# Patient Record
Sex: FEMALE | Race: OTHER | NOT HISPANIC OR LATINO
[De-identification: names, ages, dates, MRNs, and addresses within clinical notes are randomized per-mention and may not be internally consistent; named-entity substitution may affect disease eponyms.]

---

## 2017-12-12 ENCOUNTER — APPOINTMENT (OUTPATIENT)
Dept: FAMILY MEDICINE | Facility: CLINIC | Age: 57
End: 2017-12-12
Payer: COMMERCIAL

## 2017-12-12 ENCOUNTER — NON-APPOINTMENT (OUTPATIENT)
Age: 57
End: 2017-12-12

## 2017-12-12 VITALS
OXYGEN SATURATION: 98 % | BODY MASS INDEX: 34.24 KG/M2 | DIASTOLIC BLOOD PRESSURE: 76 MMHG | SYSTOLIC BLOOD PRESSURE: 112 MMHG | WEIGHT: 208 LBS | HEIGHT: 65.5 IN | HEART RATE: 70 BPM | TEMPERATURE: 98.8 F

## 2017-12-12 PROCEDURE — 99396 PREV VISIT EST AGE 40-64: CPT | Mod: 25

## 2017-12-12 PROCEDURE — 93000 ELECTROCARDIOGRAM COMPLETE: CPT

## 2018-07-24 ENCOUNTER — APPOINTMENT (OUTPATIENT)
Dept: FAMILY MEDICINE | Facility: CLINIC | Age: 58
End: 2018-07-24

## 2018-07-26 ENCOUNTER — APPOINTMENT (OUTPATIENT)
Dept: ORTHOPEDIC SURGERY | Facility: CLINIC | Age: 58
End: 2018-07-26
Payer: COMMERCIAL

## 2018-07-26 VITALS
RESPIRATION RATE: 14 BRPM | HEART RATE: 62 BPM | HEIGHT: 65.5 IN | SYSTOLIC BLOOD PRESSURE: 131 MMHG | BODY MASS INDEX: 31.27 KG/M2 | WEIGHT: 190 LBS | DIASTOLIC BLOOD PRESSURE: 88 MMHG

## 2018-07-26 PROCEDURE — 99203 OFFICE O/P NEW LOW 30 MIN: CPT

## 2018-07-26 PROCEDURE — 73030 X-RAY EXAM OF SHOULDER: CPT | Mod: RT

## 2018-12-10 ENCOUNTER — OTHER (OUTPATIENT)
Age: 58
End: 2018-12-10

## 2019-01-23 ENCOUNTER — OUTPATIENT (OUTPATIENT)
Dept: OUTPATIENT SERVICES | Facility: HOSPITAL | Age: 59
LOS: 1 days | Discharge: HOME | End: 2019-01-23

## 2019-01-23 DIAGNOSIS — C50.919 MALIGNANT NEOPLASM OF UNSPECIFIED SITE OF UNSPECIFIED FEMALE BREAST: ICD-10-CM

## 2019-02-07 ENCOUNTER — APPOINTMENT (OUTPATIENT)
Dept: HEMATOLOGY ONCOLOGY | Facility: CLINIC | Age: 59
End: 2019-02-07
Payer: COMMERCIAL

## 2019-02-07 VITALS
OXYGEN SATURATION: 98 % | TEMPERATURE: 98 F | BODY MASS INDEX: 33.71 KG/M2 | SYSTOLIC BLOOD PRESSURE: 114 MMHG | DIASTOLIC BLOOD PRESSURE: 74 MMHG | HEART RATE: 71 BPM | RESPIRATION RATE: 16 BRPM | HEIGHT: 65.51 IN | WEIGHT: 204.81 LBS

## 2019-02-07 DIAGNOSIS — Z86.39 PERSONAL HISTORY OF OTHER ENDOCRINE, NUTRITIONAL AND METABOLIC DISEASE: ICD-10-CM

## 2019-02-07 DIAGNOSIS — Z87.891 PERSONAL HISTORY OF NICOTINE DEPENDENCE: ICD-10-CM

## 2019-02-07 PROCEDURE — 99245 OFF/OP CONSLTJ NEW/EST HI 55: CPT

## 2019-02-07 NOTE — HISTORY OF PRESENT ILLNESS
[Disease: _____________________] : Disease: [unfilled] [T: ___] : T[unfilled] [N: ___] : N[unfilled] [M: ___] : M[unfilled] [de-identified] : Left breast cancer diagnosed at the age of 58\par 12/02/17 Screening mammography negative\par 12/14/18 Screening mammography showed a possible focal asymmetry in the left UOQ\par 01/03/19 Left breast diagnostic ultrasound showed an irregular hypoechoic mass at 1 o'clock axis measuring 0.6 x 1.5 x 0.8 cm, likely corresponding to a mammographic architectural distortion. Sonoguided core biopsy recommended.\par 01/04/19 Left breast 1 o'clock axis 4 cm FN sonoguided core biopsy showed infiltrating lobular carcinoma, SBR 5/9, ER/TN  %, Ki-67 10% and HER-2 negative.\par 01/14/19 MRI of the breast showed a 1.9 x 1.4 x 0.7 cm irregular mass with an irregular margin in the left breast at 2 o'clock axis, consistent with known carcinoma\par 01/24/19 Left breast 1 o'clock axis UOQ lumpectomy showed a 2 cm invasive carcinoma with ductal and lobular features, SBR 6/9 with 0/2 LN involved, ER 70%, [place] very focal weak positivity, KI-67 14% and HER-2 negative. Left inferior margin revealed multiple foci of invasive carcinoma with lobular features, largest one measuring 4 mm, located at 1.5 mm from the final inferior margin, multiple foci of LCIS as well. Left anterior medial margin showed a 1.2 mm focus of invasive carcinoma with lobular features located at 3 mm from the margin and a 1.5 mm focus about 0.5 mm from the anterior medial margin\par Oncotype ordered and is pending [de-identified] :  2 cm Invasive carcinoma with ductal and lobular features, SBR 6/9, 0/2 SLN, ER 70%, NE weakly +,  HER-2 negative, KI-67 14%. Multifocal given the presence of foci of invasive carcinoma in additional margins. [de-identified] : Courtney comes to discuss the medical management of her breast cancer. She is healing well from her surgery but is still feeling a little overwhelmed by the whole process of dealing with the cancer.  She is generally in very good health and is active. Prior to her breast cancer diagnosis she had been on hormone replacement therapy for 5-6 years for menopausal symptoms. 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 Her oldest son went to [place] and is now working in the city for a [employer] agency. Her daughter lives at home and is in school studying communications. Her youngest son is in school at [place]. \par \par HEALTH MAINTENANCE:\par Mammogram: 12/14/18 with abnormal finding consistent with invasive lobular carcinoma\par Pap smear: 11/18\par Bone density DEXA scan: none to date\par Colonoscopy: 6/12/18 negative\par Genetic testing: none to date although her sister had breast cancer at age 26; she does not know if she had genetic testing\par

## 2019-02-07 NOTE — CONSULT LETTER
[Dear  ___] : Dear  [unfilled], [Consult Letter:] : I had the pleasure of evaluating your patient, [unfilled]. [Please see my note below.] : Please see my note below. [Consult Closing:] : Thank you very much for allowing me to participate in the care of this patient.  If you have any questions, please do not hesitate to contact me. [Sincerely,] : Sincerely, [DrDayne  ___] : Dr. BOO [DrDayne ___] : Dr. BOO [FreeTextEntry2] : Hoang Rudolph MD\par [address] [place] [address]\[place] [id number] [FreeTextEntry3] : Maame Nieves MD\par Associate Chief of Clinical Affairs\par [facility]\par [facility]\par  of Medicine\par [facility]\par \par

## 2019-02-07 NOTE — PHYSICAL EXAM
[Fully active, able to carry on all pre-disease performance without restriction] : Status 0 - Fully active, able to carry on all pre-disease performance without restriction [Normal] : affect appropriate [de-identified] : Well healed incision left lateral breast

## 2019-02-11 ENCOUNTER — RESULT REVIEW (OUTPATIENT)
Age: 59
End: 2019-02-11

## 2019-02-12 ENCOUNTER — RESULT REVIEW (OUTPATIENT)
Age: 59
End: 2019-02-12

## 2019-03-07 ENCOUNTER — OUTPATIENT (OUTPATIENT)
Dept: OUTPATIENT SERVICES | Facility: HOSPITAL | Age: 59
LOS: 1 days | Discharge: HOME | End: 2019-03-07

## 2019-03-07 DIAGNOSIS — C50.919 MALIGNANT NEOPLASM OF UNSPECIFIED SITE OF UNSPECIFIED FEMALE BREAST: ICD-10-CM

## 2019-03-14 ENCOUNTER — APPOINTMENT (OUTPATIENT)
Dept: HEMATOLOGY ONCOLOGY | Facility: CLINIC | Age: 59
End: 2019-03-14
Payer: COMMERCIAL

## 2019-03-14 ENCOUNTER — RESULT REVIEW (OUTPATIENT)
Age: 59
End: 2019-03-14

## 2019-03-14 VITALS
WEIGHT: 205.03 LBS | BODY MASS INDEX: 33.59 KG/M2 | OXYGEN SATURATION: 98 % | TEMPERATURE: 98 F | DIASTOLIC BLOOD PRESSURE: 86 MMHG | SYSTOLIC BLOOD PRESSURE: 137 MMHG | RESPIRATION RATE: 16 BRPM | HEART RATE: 63 BPM

## 2019-03-14 LAB
BASOPHILS # BLD AUTO: 0 K/UL — SIGNIFICANT CHANGE UP (ref 0–0.2)
BASOPHILS NFR BLD AUTO: 1 % — SIGNIFICANT CHANGE UP (ref 0–2)
EOSINOPHIL # BLD AUTO: 0.1 K/UL — SIGNIFICANT CHANGE UP (ref 0–0.5)
EOSINOPHIL NFR BLD AUTO: 2 % — SIGNIFICANT CHANGE UP (ref 0–6)
HCT VFR BLD CALC: 41.5 % — SIGNIFICANT CHANGE UP (ref 34.5–45)
HGB BLD-MCNC: 13.8 G/DL — SIGNIFICANT CHANGE UP (ref 11.5–15.5)
LYMPHOCYTES # BLD AUTO: 1.5 K/UL — SIGNIFICANT CHANGE UP (ref 1–3.3)
LYMPHOCYTES # BLD AUTO: 42 % — SIGNIFICANT CHANGE UP (ref 13–44)
MCHC RBC-ENTMCNC: 29.3 PG — SIGNIFICANT CHANGE UP (ref 27–34)
MCHC RBC-ENTMCNC: 33.1 G/DL — SIGNIFICANT CHANGE UP (ref 32–36)
MCV RBC AUTO: 88.5 FL — SIGNIFICANT CHANGE UP (ref 80–100)
MONOCYTES # BLD AUTO: 0.2 K/UL — SIGNIFICANT CHANGE UP (ref 0–0.9)
MONOCYTES NFR BLD AUTO: 8 % — SIGNIFICANT CHANGE UP (ref 2–14)
NEUTROPHILS # BLD AUTO: 1.3 K/UL — LOW (ref 1.8–7.4)
NEUTROPHILS NFR BLD AUTO: 47 % — SIGNIFICANT CHANGE UP (ref 43–77)
PLAT MORPH BLD: NORMAL — SIGNIFICANT CHANGE UP
PLATELET # BLD AUTO: 264 K/UL — SIGNIFICANT CHANGE UP (ref 150–400)
RBC # BLD: 4.69 M/UL — SIGNIFICANT CHANGE UP (ref 3.8–5.2)
RBC # FLD: 11.8 % — SIGNIFICANT CHANGE UP (ref 10.3–14.5)
RBC BLD AUTO: NORMAL — SIGNIFICANT CHANGE UP
WBC # BLD: 3.1 K/UL — LOW (ref 3.8–10.5)
WBC # FLD AUTO: 3.1 K/UL — LOW (ref 3.8–10.5)

## 2019-03-14 PROCEDURE — 99215 OFFICE O/P EST HI 40 MIN: CPT

## 2019-03-18 RX ORDER — DEXAMETHASONE 4 MG/1
4 TABLET ORAL
Qty: 24 | Refills: 0 | Status: DISCONTINUED | COMMUNITY
Start: 2019-03-15 | End: 2019-03-18

## 2019-03-20 RX ORDER — APREPITANT 80 MG/1
80 CAPSULE ORAL
Qty: 2 | Refills: 1 | Status: DISCONTINUED | COMMUNITY
Start: 2019-03-15 | End: 2019-03-20

## 2019-03-21 ENCOUNTER — APPOINTMENT (OUTPATIENT)
Dept: INFUSION THERAPY | Facility: HOSPITAL | Age: 59
End: 2019-03-21

## 2019-03-21 ENCOUNTER — RESULT REVIEW (OUTPATIENT)
Age: 59
End: 2019-03-21

## 2019-03-21 LAB
HCT VFR BLD CALC: 39 % — SIGNIFICANT CHANGE UP (ref 34.5–45)
HGB BLD-MCNC: 13.5 G/DL — SIGNIFICANT CHANGE UP (ref 11.5–15.5)
MCHC RBC-ENTMCNC: 30.3 PG — SIGNIFICANT CHANGE UP (ref 27–34)
MCHC RBC-ENTMCNC: 34.6 G/DL — SIGNIFICANT CHANGE UP (ref 32–36)
MCV RBC AUTO: 87.4 FL — SIGNIFICANT CHANGE UP (ref 80–100)
PLATELET # BLD AUTO: 287 K/UL — SIGNIFICANT CHANGE UP (ref 150–400)
RBC # BLD: 4.46 M/UL — SIGNIFICANT CHANGE UP (ref 3.8–5.2)
RBC # FLD: 11.5 % — SIGNIFICANT CHANGE UP (ref 10.3–14.5)
WBC # BLD: 8 K/UL — SIGNIFICANT CHANGE UP (ref 3.8–10.5)
WBC # FLD AUTO: 8 K/UL — SIGNIFICANT CHANGE UP (ref 3.8–10.5)

## 2019-03-22 DIAGNOSIS — Z51.11 ENCOUNTER FOR ANTINEOPLASTIC CHEMOTHERAPY: ICD-10-CM

## 2019-03-22 DIAGNOSIS — R11.2 NAUSEA WITH VOMITING, UNSPECIFIED: ICD-10-CM

## 2019-03-22 DIAGNOSIS — Z51.89 ENCOUNTER FOR OTHER SPECIFIED AFTERCARE: ICD-10-CM

## 2019-03-27 ENCOUNTER — OTHER (OUTPATIENT)
Age: 59
End: 2019-03-27

## 2019-03-29 ENCOUNTER — OUTPATIENT (OUTPATIENT)
Dept: OUTPATIENT SERVICES | Facility: HOSPITAL | Age: 59
LOS: 1 days | Discharge: HOME | End: 2019-03-29

## 2019-03-29 DIAGNOSIS — C50.919 MALIGNANT NEOPLASM OF UNSPECIFIED SITE OF UNSPECIFIED FEMALE BREAST: ICD-10-CM

## 2019-03-29 DIAGNOSIS — Z98.890 OTHER SPECIFIED POSTPROCEDURAL STATES: Chronic | ICD-10-CM

## 2019-03-29 PROBLEM — C50.912 MALIGNANT NEOPLASM OF UNSPECIFIED SITE OF LEFT FEMALE BREAST: Chronic | Status: ACTIVE | Noted: 2019-03-20

## 2019-03-29 NOTE — HISTORY OF PRESENT ILLNESS
[Disease: _____________________] : Disease: [unfilled] [T: ___] : T[unfilled] [N: ___] : N[unfilled] [M: ___] : M[unfilled] [AJCC Stage: ____] : AJCC Stage: [unfilled] [Date: ____________] : Patient's last distress assessment performed on [unfilled]. [1 - Distress Level] : Distress Level: 1 [Patient given social work contact information and resource sheet] : Patient was given social work contact information and resource sheet [de-identified] : Left breast cancer diagnosed at the age of 58\par 12/02/17 Screening mammography negative\par 12/14/18 Screening mammography showed a possible focal asymmetry in the left UOQ\par 01/03/19 Left breast diagnostic ultrasound showed an irregular hypoechoic mass at 1 o'clock axis measuring 0.6 x 1.5 x 0.8 cm, likely corresponding to a mammographic architectural distortion. Sonoguided core biopsy recommended.\par 01/04/19 Left breast 1 o'clock axis 4 cm FN sonoguided core biopsy showed infiltrating lobular carcinoma, SBR 5/9, ER/[place]  %, Ki-67 10% and HER-2 negative.\par 01/14/19 MRI of the breast showed a 1.9 x 1.4 x 0.7 cm irregular mass with an irregular margin in the left breast at 2 o'clock axis, consistent with known carcinoma\par 01/24/19 Left breast 1 o'clock axis UOQ lumpectomy showed a 2 cm invasive carcinoma with ductal and lobular features, SBR 6/9 with 0/2 LN involved, ER 70%, [place] very focal weak positivity, KI-67 14% and HER-2 negative. Left inferior margin revealed multiple foci of invasive carcinoma with lobular features, largest one measuring 4 mm, located at 1.5 mm from the final inferior margin, multiple foci of LCIS as well. Left anterior medial margin showed a 1.2 mm focus of invasive carcinoma with lobular features located at 3 mm from the margin and a 1.5 mm focus about 0.5 mm from the anterior medial margin\par 3/1/19 Oncotype showed a recurrence score of 31, which is correlated with a 19% risk of distant recurrence at 9 years and an absolute chemotherapy benefit of >15%\par 3/21/19 Taxotere and Cytoxan with Onpro every 3 weeks x 4 [de-identified] :  2 cm Invasive carcinoma with ductal and lobular features, SBR 6/9, 0/2 SLN, ER 70%, NE weakly +,  HER-2 negative, KI-67 14%. Multifocal given the presence of foci of invasive carcinoma in additional margins, Oncotype = 31 (RR 19%) [de-identified] : Courtney presents to the office for an evaluation and review plan of care after her Oncotype DX assay revealed a high risk score of 31.\par She is here with her sister to discuss chemotherapy and the potential risks and side effects. 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 Her oldest son went to [place] and is now working in the city for a [employer] agency. Her daughter lives at home and is in school studying communications. Her youngest son is in school at [place]. \par \par HEALTH MAINTENANCE:\par Mammogram: 12/14/18 with abnormal finding consistent with invasive lobular carcinoma\par Pap smear: 11/18\par Bone density DEXA scan: none to date\par Colonoscopy: 6/12/18 negative\par Genetic testing: none to date although her sister had breast cancer at age 26; she does not know if she had genetic testing\par

## 2019-03-29 NOTE — PHYSICAL EXAM
[Fully active, able to carry on all pre-disease performance without restriction] : Status 0 - Fully active, able to carry on all pre-disease performance without restriction [Normal] : affect appropriate [de-identified] : Well healed incision left lateral breast

## 2019-03-30 LAB
25(OH)D3 SERPL-MCNC: 43.5 NG/ML
ALBUMIN SERPL ELPH-MCNC: 4.7 G/DL
ALP BLD-CCNC: 92 U/L
ALT SERPL-CCNC: 18 U/L
ANION GAP SERPL CALC-SCNC: 14 MMOL/L
APTT BLD: 33.8 SEC
AST SERPL-CCNC: 23 U/L
BILIRUB SERPL-MCNC: 0.4 MG/DL
BUN SERPL-MCNC: 12 MG/DL
CALCIUM SERPL-MCNC: 10 MG/DL
CHLORIDE SERPL-SCNC: 105 MMOL/L
CO2 SERPL-SCNC: 24 MMOL/L
CREAT SERPL-MCNC: 0.78 MG/DL
GLUCOSE SERPL-MCNC: 109 MG/DL
HBV CORE IGG+IGM SER QL: NONREACTIVE
HBV SURFACE AB SER QL: NONREACTIVE
HBV SURFACE AG SER QL: NONREACTIVE
HCV AB SER QL: NONREACTIVE
HCV S/CO RATIO: 0.18 S/CO
INR PPP: 0.97 RATIO
POTASSIUM SERPL-SCNC: 4.2 MMOL/L
PROT SERPL-MCNC: 7.4 G/DL
PT BLD: 11 SEC
SODIUM SERPL-SCNC: 143 MMOL/L

## 2019-04-02 ENCOUNTER — RX RENEWAL (OUTPATIENT)
Age: 59
End: 2019-04-02

## 2019-04-09 ENCOUNTER — RESULT REVIEW (OUTPATIENT)
Age: 59
End: 2019-04-09

## 2019-04-09 ENCOUNTER — APPOINTMENT (OUTPATIENT)
Dept: HEMATOLOGY ONCOLOGY | Facility: CLINIC | Age: 59
End: 2019-04-09
Payer: COMMERCIAL

## 2019-04-09 VITALS
TEMPERATURE: 97.7 F | OXYGEN SATURATION: 99 % | SYSTOLIC BLOOD PRESSURE: 118 MMHG | RESPIRATION RATE: 16 BRPM | BODY MASS INDEX: 34.56 KG/M2 | DIASTOLIC BLOOD PRESSURE: 80 MMHG | HEART RATE: 64 BPM | WEIGHT: 210.98 LBS

## 2019-04-09 LAB
BASOPHILS # BLD AUTO: 0 K/UL — SIGNIFICANT CHANGE UP (ref 0–0.2)
BASOPHILS NFR BLD AUTO: 1 % — SIGNIFICANT CHANGE UP (ref 0–2)
EOSINOPHIL # BLD AUTO: 0 K/UL — SIGNIFICANT CHANGE UP (ref 0–0.5)
HCT VFR BLD CALC: 35.3 % — SIGNIFICANT CHANGE UP (ref 34.5–45)
HGB BLD-MCNC: 12 G/DL — SIGNIFICANT CHANGE UP (ref 11.5–15.5)
LYMPHOCYTES # BLD AUTO: 1.3 K/UL — SIGNIFICANT CHANGE UP (ref 1–3.3)
LYMPHOCYTES # BLD AUTO: 45 % — HIGH (ref 13–44)
MCHC RBC-ENTMCNC: 29.9 PG — SIGNIFICANT CHANGE UP (ref 27–34)
MCHC RBC-ENTMCNC: 33.9 G/DL — SIGNIFICANT CHANGE UP (ref 32–36)
MCV RBC AUTO: 88.1 FL — SIGNIFICANT CHANGE UP (ref 80–100)
MONOCYTES # BLD AUTO: 0.6 K/UL — SIGNIFICANT CHANGE UP (ref 0–0.9)
MONOCYTES NFR BLD AUTO: 15 % — HIGH (ref 2–14)
NEUTROPHILS # BLD AUTO: 1.3 K/UL — LOW (ref 1.8–7.4)
NEUTROPHILS NFR BLD AUTO: 39 % — LOW (ref 43–77)
PLAT MORPH BLD: NORMAL — SIGNIFICANT CHANGE UP
PLATELET # BLD AUTO: 251 K/UL — SIGNIFICANT CHANGE UP (ref 150–400)
RBC # BLD: 4.01 M/UL — SIGNIFICANT CHANGE UP (ref 3.8–5.2)
RBC # FLD: 12.6 % — SIGNIFICANT CHANGE UP (ref 10.3–14.5)
RBC BLD AUTO: NORMAL — SIGNIFICANT CHANGE UP
WBC # BLD: 3.2 K/UL — LOW (ref 3.8–10.5)
WBC # FLD AUTO: 3.2 K/UL — LOW (ref 3.8–10.5)

## 2019-04-09 PROCEDURE — 99215 OFFICE O/P EST HI 40 MIN: CPT

## 2019-04-10 LAB
ALBUMIN SERPL ELPH-MCNC: 3.9 G/DL
ALP BLD-CCNC: 104 U/L
ALT SERPL-CCNC: 30 U/L
ANION GAP SERPL CALC-SCNC: 12 MMOL/L
AST SERPL-CCNC: 25 U/L
BILIRUB SERPL-MCNC: 0.2 MG/DL
BUN SERPL-MCNC: 11 MG/DL
CALCIUM SERPL-MCNC: 9.4 MG/DL
CHLORIDE SERPL-SCNC: 105 MMOL/L
CO2 SERPL-SCNC: 24 MMOL/L
CREAT SERPL-MCNC: 0.75 MG/DL
GLUCOSE SERPL-MCNC: 92 MG/DL
POTASSIUM SERPL-SCNC: 4.6 MMOL/L
PROT SERPL-MCNC: 6.3 G/DL
SODIUM SERPL-SCNC: 141 MMOL/L

## 2019-04-11 ENCOUNTER — APPOINTMENT (OUTPATIENT)
Dept: INFUSION THERAPY | Facility: HOSPITAL | Age: 59
End: 2019-04-11

## 2019-04-11 NOTE — PHYSICAL EXAM
[Fully active, able to carry on all pre-disease performance without restriction] : Status 0 - Fully active, able to carry on all pre-disease performance without restriction [Normal] : affect appropriate [de-identified] : Well healed incision left lateral breast [de-identified] : Resolving Ecchymosis of the left upper extremity and right lower extremity

## 2019-04-11 NOTE — HISTORY OF PRESENT ILLNESS
[Disease: _____________________] : Disease: [unfilled] [T: ___] : T[unfilled] [N: ___] : N[unfilled] [M: ___] : M[unfilled] [AJCC Stage: ____] : AJCC Stage: [unfilled] [de-identified] : Left breast cancer diagnosed at the age of 58\par 12/02/17 Screening mammography negative\par 12/14/18 Screening mammography showed a possible focal asymmetry in the left UOQ\par 01/03/19 Left breast diagnostic ultrasound showed an irregular hypoechoic mass at 1 o'clock axis measuring 0.6 x 1.5 x 0.8 cm, likely corresponding to a mammographic architectural distortion. Sonoguided core biopsy recommended.\par 01/04/19 Left breast 1 o'clock axis 4 cm FN sonoguided core biopsy showed infiltrating lobular carcinoma, SBR 5/9, ER/[place]  %, Ki-67 10% and HER-2 negative.\par 01/14/19 MRI of the breast showed a 1.9 x 1.4 x 0.7 cm irregular mass with an irregular margin in the left breast at 2 o'clock axis, consistent with known carcinoma\par 01/24/19 Left breast 1 o'clock axis UOQ lumpectomy showed a 2 cm invasive carcinoma with ductal and lobular features, SBR 6/9 with 0/2 LN involved, ER 70%, [place] very focal weak positivity, KI-67 14% and HER-2 negative. Left inferior margin revealed multiple foci of invasive carcinoma with lobular features, largest one measuring 4 mm, located at 1.5 mm from the final inferior margin, multiple foci of LCIS as well. Left anterior medial margin showed a 1.2 mm focus of invasive carcinoma with lobular features located at 3 mm from the margin and a 1.5 mm focus about 0.5 mm from the anterior medial margin\par 3/1/19 Oncotype showed a recurrence score of 31, which is correlated with a 19% risk of distant recurrence at 9 years and an absolute chemotherapy benefit of >15%\par 3/21/19 Taxotere and Cytoxan with Onpro every 3 weeks x 4 [de-identified] : Courtney presents to the office for an evaluation s/p TC # 1 x 3 weeks which she tolerated fairly well the first 3-4 days after chemotherapy. \par She reports developing severe bone pain on day 5 post treatment which lasted for 2 days with no significant improvement of pain on Advil alone and so she took a single dose Oxycodone previously prescribed to her by her surgeon  with complete resolution of her pain. Her overall energy was very high the first few days post treatment to the point that she couldn't sleep at nights for a few days. She was then given a prescription for Xanax which helped her get some sleep.\par Constipation was an issue for few days after her treatment which she was able to manage with intake of prune juice.\par She reports experiencing more rhinorrhea and teary eyes which she attributed to her seasonal allergies, which she mentions were never this intense. She has been taking Claritin daily with mild improvement in her symptoms. 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 \par \par She is  with 3 children ages 25, 23 and 19. Her oldest son went to [place] and is now working in the city for a talent agency. Her daughter lives at home and is in school studying [employer]. Her youngest son is in school at [place]. \par \par HEALTH MAINTENANCE:\par Mammogram: 12/14/18 with abnormal finding consistent with invasive lobular carcinoma\par Pap smear: 11/18\par Bone density DEXA scan: none to date\par Colonoscopy: 6/12/18 negative\par Genetic testing: none to date although her sister had breast cancer at age 26; she does not know if she had genetic testing\par  [de-identified] :  2 cm Invasive carcinoma with ductal and lobular features, SBR 6/9, 0/2 SLN, ER 70%, SD weakly +,  HER-2 negative, KI-67 14%. Multifocal given the presence of foci of invasive carcinoma in additional margins, Oncotype = 31 (RR 19%)

## 2019-04-12 DIAGNOSIS — Z51.89 ENCOUNTER FOR OTHER SPECIFIED AFTERCARE: ICD-10-CM

## 2019-04-12 DIAGNOSIS — E86.0 DEHYDRATION: ICD-10-CM

## 2019-04-12 DIAGNOSIS — Z51.11 ENCOUNTER FOR ANTINEOPLASTIC CHEMOTHERAPY: ICD-10-CM

## 2019-04-12 DIAGNOSIS — R11.2 NAUSEA WITH VOMITING, UNSPECIFIED: ICD-10-CM

## 2019-04-29 ENCOUNTER — OUTPATIENT (OUTPATIENT)
Dept: OUTPATIENT SERVICES | Facility: HOSPITAL | Age: 59
LOS: 1 days | Discharge: HOME | End: 2019-04-29

## 2019-04-29 DIAGNOSIS — Z98.890 OTHER SPECIFIED POSTPROCEDURAL STATES: Chronic | ICD-10-CM

## 2019-04-29 DIAGNOSIS — C50.919 MALIGNANT NEOPLASM OF UNSPECIFIED SITE OF UNSPECIFIED FEMALE BREAST: ICD-10-CM

## 2019-04-30 ENCOUNTER — APPOINTMENT (OUTPATIENT)
Dept: HEMATOLOGY ONCOLOGY | Facility: CLINIC | Age: 59
End: 2019-04-30
Payer: COMMERCIAL

## 2019-04-30 ENCOUNTER — RESULT REVIEW (OUTPATIENT)
Age: 59
End: 2019-04-30

## 2019-04-30 VITALS
BODY MASS INDEX: 35.03 KG/M2 | RESPIRATION RATE: 16 BRPM | TEMPERATURE: 99 F | HEART RATE: 69 BPM | WEIGHT: 213.84 LBS | OXYGEN SATURATION: 99 % | SYSTOLIC BLOOD PRESSURE: 101 MMHG | DIASTOLIC BLOOD PRESSURE: 66 MMHG

## 2019-04-30 LAB
BASOPHILS # BLD AUTO: 0 K/UL — SIGNIFICANT CHANGE UP (ref 0–0.2)
BASOPHILS NFR BLD AUTO: 1 % — SIGNIFICANT CHANGE UP (ref 0–2)
EOSINOPHIL # BLD AUTO: 0 K/UL — SIGNIFICANT CHANGE UP (ref 0–0.5)
EOSINOPHIL NFR BLD AUTO: 1.1 % — SIGNIFICANT CHANGE UP (ref 0–6)
HCT VFR BLD CALC: 32.8 % — LOW (ref 34.5–45)
HGB BLD-MCNC: 10.7 G/DL — LOW (ref 11.5–15.5)
LYMPHOCYTES # BLD AUTO: 1.2 K/UL — SIGNIFICANT CHANGE UP (ref 1–3.3)
LYMPHOCYTES # BLD AUTO: 34.3 % — SIGNIFICANT CHANGE UP (ref 13–44)
MCHC RBC-ENTMCNC: 29.1 PG — SIGNIFICANT CHANGE UP (ref 27–34)
MCHC RBC-ENTMCNC: 32.6 G/DL — SIGNIFICANT CHANGE UP (ref 32–36)
MCV RBC AUTO: 89 FL — SIGNIFICANT CHANGE UP (ref 80–100)
MONOCYTES # BLD AUTO: 0.6 K/UL — SIGNIFICANT CHANGE UP (ref 0–0.9)
MONOCYTES NFR BLD AUTO: 16.1 % — HIGH (ref 2–14)
NEUTROPHILS # BLD AUTO: 1.6 K/UL — LOW (ref 1.8–7.4)
NEUTROPHILS NFR BLD AUTO: 47.5 % — SIGNIFICANT CHANGE UP (ref 43–77)
PLATELET # BLD AUTO: 219 K/UL — SIGNIFICANT CHANGE UP (ref 150–400)
RBC # BLD: 3.68 M/UL — LOW (ref 3.8–5.2)
RBC # FLD: 14.3 % — SIGNIFICANT CHANGE UP (ref 10.3–14.5)
WBC # BLD: 3.5 K/UL — LOW (ref 3.8–10.5)
WBC # FLD AUTO: 3.5 K/UL — LOW (ref 3.8–10.5)

## 2019-04-30 PROCEDURE — 99215 OFFICE O/P EST HI 40 MIN: CPT

## 2019-04-30 NOTE — HISTORY OF PRESENT ILLNESS
[Disease: _____________________] : Disease: [unfilled] [T: ___] : T[unfilled] [N: ___] : N[unfilled] [M: ___] : M[unfilled] [AJCC Stage: ____] : AJCC Stage: [unfilled] [de-identified] : Left breast cancer diagnosed at the age of 58\par 12/02/17 Screening mammography negative\par 12/14/18 Screening mammography showed a possible focal asymmetry in the left UOQ\par 01/03/19 Left breast diagnostic ultrasound showed an irregular hypoechoic mass at 1 o'clock axis measuring 0.6 x 1.5 x 0.8 cm, likely corresponding to a mammographic architectural distortion. Sonoguided core biopsy recommended.\par 01/04/19 Left breast 1 o'clock axis 4 cm FN sonoguided core biopsy showed infiltrating lobular carcinoma, SBR 5/9, ER/[place]  %, Ki-67 10% and HER-2 negative.\par 01/14/19 MRI of the breast showed a 1.9 x 1.4 x 0.7 cm irregular mass with an irregular margin in the left breast at 2 o'clock axis, consistent with known carcinoma\par 01/24/19 Left breast 1 o'clock axis UOQ lumpectomy showed a 2 cm invasive carcinoma with ductal and lobular features, SBR 6/9 with 0/2 LN involved, ER 70%, [place] very focal weak positivity, KI-67 14% and HER-2 negative. Left inferior margin revealed multiple foci of invasive carcinoma with lobular features, largest one measuring 4 mm, located at 1.5 mm from the final inferior margin, multiple foci of LCIS as well. Left anterior medial margin showed a 1.2 mm focus of invasive carcinoma with lobular features located at 3 mm from the margin and a 1.5 mm focus about 0.5 mm from the anterior medial margin\par 3/1/19 Oncotype showed a recurrence score of 31, which is correlated with a 19% risk of distant recurrence at 9 years and an absolute chemotherapy benefit of >15%\par 3/21/19 Taxotere and Cytoxan with Onpro every 3 weeks x 4 [de-identified] :  2 cm Invasive carcinoma with ductal and lobular features, SBR 6/9, 0/2 SLN, ER 70%, [place] weakly +,  HER-2 negative, KI-67 14%. Multifocal given the presence of foci of invasive carcinoma in additional margins, Oncotype = 31 (RR 19%) [de-identified] : Courtney presents to the office for an evaluation s/p TC # 2 three weeks ago which she tolerated fairly well although she does continue to have some fatigue after each cycle.  Last week she noted some SOB with ambulation but is feeling better now.  She has been taking the Claritin and has not needed oxycodone for the bone pain\par She has learned to manage the constipation with diet so that has been less of a concern.\par She continues to struggle with some anxiety associated with the cancer diagnosis and treatment but is coping well overall.  She does take the Xanax at night when she needs it to help her sleep and occasionally takes one at work when she is feeling overwhelmed.  She has good support overall and is prioritizing herself through this time.\par She denies any fever, chills, night sweats, SOB, CP, N/V, diarrhea or neuropathy in her extremities.\par Her sister had breast cancer at age 26 and she does not know if she had genetic testing.\par \par HEALTH MAINTENANCE:\par Mammogram: 12/14/18 with abnormal finding consistent with invasive lobular carcinoma\par Pap smear: 11/18\par Bone density DEXA scan: none to date\par Colonoscopy: 6/12/18 negative\par Genetic testing: none to date although her sister had breast cancer at age 26\par

## 2019-04-30 NOTE — PHYSICAL EXAM
[Fully active, able to carry on all pre-disease performance without restriction] : Status 0 - Fully active, able to carry on all pre-disease performance without restriction [Normal] : affect appropriate [de-identified] : Well healed incision left lateral breast [de-identified] : Resolving Ecchymosis of the left upper extremity and right lower extremity

## 2019-05-01 LAB
ALBUMIN SERPL ELPH-MCNC: 4 G/DL
ALP BLD-CCNC: 82 U/L
ALT SERPL-CCNC: 26 U/L
ANION GAP SERPL CALC-SCNC: 11 MMOL/L
AST SERPL-CCNC: 22 U/L
BILIRUB SERPL-MCNC: 0.3 MG/DL
BUN SERPL-MCNC: 14 MG/DL
CALCIUM SERPL-MCNC: 9 MG/DL
CHLORIDE SERPL-SCNC: 105 MMOL/L
CO2 SERPL-SCNC: 25 MMOL/L
CREAT SERPL-MCNC: 0.72 MG/DL
GLUCOSE SERPL-MCNC: 97 MG/DL
POTASSIUM SERPL-SCNC: 3.9 MMOL/L
PROT SERPL-MCNC: 6.1 G/DL
SODIUM SERPL-SCNC: 141 MMOL/L

## 2019-05-02 ENCOUNTER — APPOINTMENT (OUTPATIENT)
Dept: INFUSION THERAPY | Facility: HOSPITAL | Age: 59
End: 2019-05-02

## 2019-05-03 DIAGNOSIS — Z51.89 ENCOUNTER FOR OTHER SPECIFIED AFTERCARE: ICD-10-CM

## 2019-05-03 DIAGNOSIS — Z51.11 ENCOUNTER FOR ANTINEOPLASTIC CHEMOTHERAPY: ICD-10-CM

## 2019-05-03 DIAGNOSIS — R11.2 NAUSEA WITH VOMITING, UNSPECIFIED: ICD-10-CM

## 2019-05-20 ENCOUNTER — LABORATORY RESULT (OUTPATIENT)
Age: 59
End: 2019-05-20

## 2019-05-21 LAB
ALBUMIN SERPL ELPH-MCNC: 4.2 G/DL
ALP BLD-CCNC: 84 U/L
ALT SERPL-CCNC: 19 U/L
ANION GAP SERPL CALC-SCNC: 13 MMOL/L
AST SERPL-CCNC: 22 U/L
BASOPHILS # BLD AUTO: 0.04 K/UL
BASOPHILS NFR BLD AUTO: 1.3 %
BILIRUB SERPL-MCNC: 0.3 MG/DL
BUN SERPL-MCNC: 11 MG/DL
CALCIUM SERPL-MCNC: 9.4 MG/DL
CHLORIDE SERPL-SCNC: 107 MMOL/L
CO2 SERPL-SCNC: 21 MMOL/L
CREAT SERPL-MCNC: 0.72 MG/DL
EOSINOPHIL # BLD AUTO: 0.03 K/UL
EOSINOPHIL NFR BLD AUTO: 1 %
GLUCOSE SERPL-MCNC: 99 MG/DL
HCT VFR BLD CALC: 32.7 %
HGB BLD-MCNC: 10.2 G/DL
IMM GRANULOCYTES NFR BLD AUTO: 0.6 %
LYMPHOCYTES # BLD AUTO: 1.15 K/UL
LYMPHOCYTES NFR BLD AUTO: 36.5 %
MAN DIFF?: NORMAL
MCHC RBC-ENTMCNC: 28.8 PG
MCHC RBC-ENTMCNC: 31.2 GM/DL
MCV RBC AUTO: 92.4 FL
MONOCYTES # BLD AUTO: 0.48 K/UL
MONOCYTES NFR BLD AUTO: 15.2 %
NEUTROPHILS # BLD AUTO: 1.43 K/UL
NEUTROPHILS NFR BLD AUTO: 45.4 %
PLATELET # BLD AUTO: 226 K/UL
POTASSIUM SERPL-SCNC: 4 MMOL/L
PROT SERPL-MCNC: 6.8 G/DL
RBC # BLD: 3.54 M/UL
RBC # FLD: 16.7 %
SODIUM SERPL-SCNC: 141 MMOL/L
WBC # FLD AUTO: 3.15 K/UL

## 2019-05-23 ENCOUNTER — RESULT REVIEW (OUTPATIENT)
Age: 59
End: 2019-05-23

## 2019-05-23 ENCOUNTER — APPOINTMENT (OUTPATIENT)
Dept: INFUSION THERAPY | Facility: HOSPITAL | Age: 59
End: 2019-05-23

## 2019-05-23 ENCOUNTER — APPOINTMENT (OUTPATIENT)
Dept: HEMATOLOGY ONCOLOGY | Facility: CLINIC | Age: 59
End: 2019-05-23
Payer: COMMERCIAL

## 2019-05-23 VITALS
DIASTOLIC BLOOD PRESSURE: 76 MMHG | SYSTOLIC BLOOD PRESSURE: 120 MMHG | OXYGEN SATURATION: 100 % | TEMPERATURE: 98.2 F | HEART RATE: 75 BPM | RESPIRATION RATE: 16 BRPM | WEIGHT: 211.64 LBS | BODY MASS INDEX: 34.67 KG/M2

## 2019-05-23 LAB
HCT VFR BLD CALC: 30.7 % — LOW (ref 34.5–45)
HGB BLD-MCNC: 10.4 G/DL — LOW (ref 11.5–15.5)
MCHC RBC-ENTMCNC: 30.4 PG — SIGNIFICANT CHANGE UP (ref 27–34)
MCHC RBC-ENTMCNC: 34 G/DL — SIGNIFICANT CHANGE UP (ref 32–36)
MCV RBC AUTO: 89.4 FL — SIGNIFICANT CHANGE UP (ref 80–100)
PLATELET # BLD AUTO: 312 K/UL — SIGNIFICANT CHANGE UP (ref 150–400)
RBC # BLD: 3.43 M/UL — LOW (ref 3.8–5.2)
RBC # FLD: 15 % — HIGH (ref 10.3–14.5)
WBC # BLD: 4.4 K/UL — SIGNIFICANT CHANGE UP (ref 3.8–10.5)
WBC # FLD AUTO: 4.4 K/UL — SIGNIFICANT CHANGE UP (ref 3.8–10.5)

## 2019-05-23 PROCEDURE — 99215 OFFICE O/P EST HI 40 MIN: CPT

## 2019-05-24 DIAGNOSIS — E86.0 DEHYDRATION: ICD-10-CM

## 2019-05-24 NOTE — HISTORY OF PRESENT ILLNESS
[Disease: _____________________] : Disease: [unfilled] [T: ___] : T[unfilled] [N: ___] : N[unfilled] [M: ___] : M[unfilled] [AJCC Stage: ____] : AJCC Stage: [unfilled] [de-identified] : Left breast cancer diagnosed at the age of 58\par 12/02/17 Screening mammography negative\par 12/14/18 Screening mammography showed a possible focal asymmetry in the left UOQ\par 01/03/19 Left breast diagnostic ultrasound showed an irregular hypoechoic mass at 1 o'clock axis measuring 0.6 x 1.5 x 0.8 cm, likely corresponding to a mammographic architectural distortion. Sonoguided core biopsy recommended.\par 01/04/19 Left breast 1 o'clock axis 4 cm FN sonoguided core biopsy showed infiltrating lobular carcinoma, SBR 5/9, ER/[place]  %, Ki-67 10% and HER-2 negative.\par 01/14/19 MRI of the breast showed a 1.9 x 1.4 x 0.7 cm irregular mass with an irregular margin in the left breast at 2 o'clock axis, consistent with known carcinoma\par 01/24/19 Left breast 1 o'clock axis UOQ lumpectomy showed a 2 cm invasive carcinoma with ductal and lobular features, SBR 6/9 with 0/2 LN involved, ER 70%, [place] very focal weak positivity, KI-67 14% and HER-2 negative. Left inferior margin revealed multiple foci of invasive carcinoma with lobular features, largest one measuring 4 mm, located at 1.5 mm from the final inferior margin, multiple foci of LCIS as well. Left anterior medial margin showed a 1.2 mm focus of invasive carcinoma with lobular features located at 3 mm from the margin and a 1.5 mm focus about 0.5 mm from the anterior medial margin\par 3/1/19 Oncotype showed a recurrence score of 31, which is correlated with a 19% risk of distant recurrence at 9 years and an absolute chemotherapy benefit of >15%\par 3/21/19 Taxotere and Cytoxan with Onpro every 3 weeks x 4 [de-identified] :  2 cm Invasive carcinoma with ductal and lobular features, SBR 6/9, 0/2 SLN, ER 70%, [place] weakly +,  HER-2 negative, KI-67 14%. Multifocal given the presence of foci of invasive carcinoma in additional margins, Oncotype = 31 (RR 19%) [de-identified] : Courtney presents to the office for an evaluation s/p TC # 3 three weeks ago. She reports slower recovery especially in her bone pain from the Onpro. She states that usually the pain comes about a few day after her treatment and lingers on for 2 days at at most, but has noticed with the most recent treatment the onset of pain was almost immediate and last 3-4 days. She has been taking Claritin, Percocet daily for 4 days and Advil as needed for breakthrough pain. The pain in the hip and lower back seems to have extended in the second week post treatment with associated fatigue. She mentioned experiencing some numbness in her feet which was intermittent in nature and lasted only for 1-2 days without any tingling sensation or pain. The irritability from dexamethasone use prior and post treatment were well controlled with intake of Xanax.\par She continues to struggle with some anxiety associated with the cancer diagnosis and treatment but is coping well overall.  She does take the Xanax at night when she needs it to help her sleep and occasionally takes one at work when she is feeling overwhelmed.  She has good support overall and is prioritizing herself through this time.\par She denies any fever, chills, night sweats, SOB, CP, N/V, diarrhea or neuropathy in her extremities.\par \par Her sister had breast cancer at age 26 and she does not know if she had genetic testing. Ordered \par \par HEALTH MAINTENANCE:\par Mammogram: 12/14/18 with abnormal finding consistent with invasive lobular carcinoma\par Pap smear: 11/18\par Bone density DEXA scan: none to date\par Colonoscopy: 6/12/18 negative\par Genetic testing: none to date although her sister had breast cancer at age 26. [facility] genetic panel of 47 genes were ordered\par

## 2019-05-24 NOTE — PHYSICAL EXAM
[Fully active, able to carry on all pre-disease performance without restriction] : Status 0 - Fully active, able to carry on all pre-disease performance without restriction [Normal] : normal appearance, no rash, nodules, vesicles, ulcers, erythema [de-identified] : Well healed incision left lateral breast

## 2019-06-03 ENCOUNTER — OUTPATIENT (OUTPATIENT)
Dept: OUTPATIENT SERVICES | Facility: HOSPITAL | Age: 59
LOS: 1 days | Discharge: HOME | End: 2019-06-03
Payer: COMMERCIAL

## 2019-06-03 DIAGNOSIS — Z98.890 OTHER SPECIFIED POSTPROCEDURAL STATES: Chronic | ICD-10-CM

## 2019-06-05 ENCOUNTER — APPOINTMENT (OUTPATIENT)
Dept: RADIATION ONCOLOGY | Facility: CLINIC | Age: 59
End: 2019-06-05
Payer: COMMERCIAL

## 2019-06-05 VITALS
DIASTOLIC BLOOD PRESSURE: 68 MMHG | RESPIRATION RATE: 17 BRPM | HEART RATE: 66 BPM | OXYGEN SATURATION: 100 % | WEIGHT: 210 LBS | BODY MASS INDEX: 34.4 KG/M2 | SYSTOLIC BLOOD PRESSURE: 95 MMHG | TEMPERATURE: 98.6 F

## 2019-06-05 PROCEDURE — 77263 THER RADIOLOGY TX PLNG CPLX: CPT

## 2019-06-05 PROCEDURE — 99204 OFFICE O/P NEW MOD 45 MIN: CPT | Mod: 25

## 2019-06-05 NOTE — HISTORY OF PRESENT ILLNESS
[FreeTextEntry1] : This 58 year-old woman presents for radiation medicine consultation.  She was recently diagnosed with infiltrating lobular carcinoma, which was found on routine screening mammogram as follows:  \par \par 12/02/17 Screening mammography negative\par 12/14/18 Screening mammography showed a possible focal asymmetry in the left UOQ\par 01/03/19 Left breast diagnostic ultrasound showed an irregular hypoechoic mass at 1 o'clock axis measuring 0.6 x 1.5 x 0.8 cm, likely corresponding to a mammographic architectural distortion. Sonoguided core biopsy recommended.\par 01/04/19 Left breast 1 o'clock axis 4 cm FN sonoguided core biopsy showed infiltrating lobular carcinoma, SBR 5/9, ER/[place]  %, Ki-67 10% and HER-2 negative.\par 01/14/19 MRI of the breast showed a 1.9 x 1.4 x 0.7 cm irregular mass with an irregular margin in the left breast at 2 o'clock axis, consistent with known carcinoma\par 01/24/19 Left breast 1 o'clock axis UOQ lumpectomy showed a 2 cm invasive carcinoma with ductal and lobular features, SBR 6/9 with 0/2 LN involved, ER 70%, [place] very focal weak positivity, KI-67 14% and HER-2 negative. Left inferior margin revealed multiple foci of invasive carcinoma with lobular features, largest one measuring 4 mm, located at 1.5 mm from the final inferior margin, multiple foci of LCIS as well. Left anterior medial margin showed a 1.2 mm focus of invasive carcinoma with lobular features located at 3 mm from the margin and a 1.5 mm focus about 0.5 mm from the anterior medial margin\par 3/1/19 Oncotype showed a recurrence score of 31, which is correlated with a 19% risk of distant recurrence at 9 years and an absolute chemotherapy benefit of >15%\par 3/21/19 Taxotere and Cytoxan with Onpro every 3 weeks X4 cycles, now completed 2 weeks ago.\par \par \par

## 2019-06-05 NOTE — VITALS
[NoTreatment Scheduled] : no treatment scheduled [Least Pain Intensity: 0/10] : 0/10 [80: Normal activity with effort; some signs or symptoms of disease.] : 80: Normal activity with effort; some signs or symptoms of disease.  [ECOG Performance Status: 1 - Restricted in physically strenuous activity but ambulatory and able to carry out work of a light or sedentary nature] : Performance Status: 1 - Restricted in physically strenuous activity but ambulatory and able to carry out work of a light or sedentary nature, e.g., light house work, office work [Pain Location: ___] : Pain Location: [unfilled] [Maximal Pain Intensity: 2/10] : 2/10

## 2019-06-05 NOTE — LETTER GREETING
[Dear Doctor] : Dear Doctor, [Consult Letter:] : Your patient, [unfilled] was seen in my office today for consultation. [Please see my note below.] : Please see my note below. [FreeTextEntry2] : Maame Seaman MD

## 2019-06-05 NOTE — REVIEW OF SYSTEMS
[Insomnia] : insomnia [Anxiety] : anxiety [Breast Pain: Grade 0] : Breast Pain: Grade 0 [Abdominal Pain] : no abdominal pain [Vomiting] : no vomiting [Constipation] : no constipation [Diarrhea] : no diarrhea [Joint Pain] : joint pain [Gait Disturbance] : no gait disturbance [Negative] : Allergic/Immunologic [FreeTextEntry7] : LAP band in place [de-identified] : s/p chemotherapy [FreeTextEntry9] : left knee arthroscopy; left knee pain.

## 2019-06-05 NOTE — DISEASE MANAGEMENT
[Pathological] : TNM Stage: p [I] : I [FreeTextEntry4] : infiltrating lobular carcinoma [TTNM] : 1c [NTNM] : 0 [MTNM] : 0 [de-identified] : [id number] [de-identified] : left breast

## 2019-06-05 NOTE — PHYSICAL EXAM
[Obese] : obese [Normal] : normal spine exam without palpable tenderness, no kyphosis or scoliosis [de-identified] : well healed surgical scar upper outer quadrant in left breast. [FreeTextEntry1] : deferred [de-identified] : deferred

## 2019-06-05 NOTE — LETTER CLOSING
[Consult Closing:] : Thank you for allowing me to participate in the care of this patient.  If you have any questions, please do not hesitate to contact me. [Sincerely yours,] : Sincerely yours, [FreeTextEntry3] : Christian Lorenzana MD\par Physician in Chief\par Department of Radiation Medicine\par [facility]\par [facility]\par \par  of Radiation Medicine\par [facility]\par at  [facility]\par \par Radiation \par [facility]/\par [facility] [place]\par [address]\par [place], [place] [address]\par \par Tel: [phone]\par Fax: [phone]\par

## 2019-06-11 ENCOUNTER — OUTPATIENT (OUTPATIENT)
Dept: OUTPATIENT SERVICES | Facility: HOSPITAL | Age: 59
LOS: 1 days | Discharge: HOME | End: 2019-06-11

## 2019-06-11 DIAGNOSIS — C50.919 MALIGNANT NEOPLASM OF UNSPECIFIED SITE OF UNSPECIFIED FEMALE BREAST: ICD-10-CM

## 2019-06-11 DIAGNOSIS — Z98.890 OTHER SPECIFIED POSTPROCEDURAL STATES: Chronic | ICD-10-CM

## 2019-06-12 PROCEDURE — 77334 RADIATION TREATMENT AID(S): CPT | Mod: 26

## 2019-06-12 PROCEDURE — 77290 THER RAD SIMULAJ FIELD CPLX: CPT | Mod: 26

## 2019-06-13 ENCOUNTER — RESULT REVIEW (OUTPATIENT)
Age: 59
End: 2019-06-13

## 2019-06-13 ENCOUNTER — APPOINTMENT (OUTPATIENT)
Dept: HEMATOLOGY ONCOLOGY | Facility: CLINIC | Age: 59
End: 2019-06-13
Payer: COMMERCIAL

## 2019-06-13 VITALS
OXYGEN SATURATION: 100 % | BODY MASS INDEX: 34.31 KG/M2 | TEMPERATURE: 98 F | WEIGHT: 209.44 LBS | RESPIRATION RATE: 16 BRPM | DIASTOLIC BLOOD PRESSURE: 71 MMHG | HEART RATE: 71 BPM | SYSTOLIC BLOOD PRESSURE: 110 MMHG

## 2019-06-13 DIAGNOSIS — M89.8X9 OTHER SPECIFIED DISORDERS OF BONE, UNSPECIFIED SITE: ICD-10-CM

## 2019-06-13 LAB
ALBUMIN SERPL ELPH-MCNC: 4.3 G/DL
ALP BLD-CCNC: 71 U/L
ALT SERPL-CCNC: 13 U/L
ANION GAP SERPL CALC-SCNC: 11 MMOL/L
AST SERPL-CCNC: 17 U/L
BASOPHILS # BLD AUTO: 0 K/UL — SIGNIFICANT CHANGE UP (ref 0–0.2)
BILIRUB SERPL-MCNC: 0.3 MG/DL
BUN SERPL-MCNC: 11 MG/DL
CALCIUM SERPL-MCNC: 9.6 MG/DL
CHLORIDE SERPL-SCNC: 106 MMOL/L
CO2 SERPL-SCNC: 24 MMOL/L
CREAT SERPL-MCNC: 0.75 MG/DL
EOSINOPHIL # BLD AUTO: 0 K/UL — SIGNIFICANT CHANGE UP (ref 0–0.5)
EOSINOPHIL NFR BLD AUTO: 1 % — SIGNIFICANT CHANGE UP (ref 0–6)
ESTRADIOL SERPL-MCNC: <5 PG/ML
FSH SERPL-MCNC: 64.6 IU/L
GLUCOSE SERPL-MCNC: 92 MG/DL
HCT VFR BLD CALC: 31.9 % — LOW (ref 34.5–45)
HGB BLD-MCNC: 10.5 G/DL — LOW (ref 11.5–15.5)
LYMPHOCYTES # BLD AUTO: 1.3 K/UL — SIGNIFICANT CHANGE UP (ref 1–3.3)
LYMPHOCYTES # BLD AUTO: 51 % — HIGH (ref 13–44)
MCHC RBC-ENTMCNC: 29.9 PG — SIGNIFICANT CHANGE UP (ref 27–34)
MCHC RBC-ENTMCNC: 32.9 G/DL — SIGNIFICANT CHANGE UP (ref 32–36)
MCV RBC AUTO: 91.1 FL — SIGNIFICANT CHANGE UP (ref 80–100)
MONOCYTES # BLD AUTO: 0.3 K/UL — SIGNIFICANT CHANGE UP (ref 0–0.9)
MONOCYTES NFR BLD AUTO: 9 % — SIGNIFICANT CHANGE UP (ref 2–14)
NEUTROPHILS # BLD AUTO: 0.9 K/UL — LOW (ref 1.8–7.4)
NEUTROPHILS NFR BLD AUTO: 39 % — LOW (ref 43–77)
PLAT MORPH BLD: NORMAL — SIGNIFICANT CHANGE UP
PLATELET # BLD AUTO: 257 K/UL — SIGNIFICANT CHANGE UP (ref 150–400)
POTASSIUM SERPL-SCNC: 4.4 MMOL/L
PROT SERPL-MCNC: 6.2 G/DL
RBC # BLD: 3.5 M/UL — LOW (ref 3.8–5.2)
RBC # FLD: 15.6 % — HIGH (ref 10.3–14.5)
RBC BLD AUTO: SIGNIFICANT CHANGE UP
SODIUM SERPL-SCNC: 141 MMOL/L
WBC # BLD: 2.6 K/UL — LOW (ref 3.8–10.5)
WBC # FLD AUTO: 2.6 K/UL — LOW (ref 3.8–10.5)

## 2019-06-13 PROCEDURE — 99215 OFFICE O/P EST HI 40 MIN: CPT

## 2019-06-13 RX ORDER — OXYCODONE AND ACETAMINOPHEN 5; 325 MG/1; MG/1
5-325 TABLET ORAL
Qty: 10 | Refills: 0 | Status: DISCONTINUED | COMMUNITY
Start: 2019-03-27 | End: 2019-06-13

## 2019-06-13 RX ORDER — DEXAMETHASONE 4 MG/1
4 TABLET ORAL
Qty: 24 | Refills: 0 | Status: DISCONTINUED | COMMUNITY
Start: 2019-03-20 | End: 2019-06-13

## 2019-06-13 RX ORDER — METOCLOPRAMIDE 10 MG/1
10 TABLET ORAL EVERY 6 HOURS
Qty: 30 | Refills: 1 | Status: DISCONTINUED | COMMUNITY
Start: 2019-03-15 | End: 2019-06-13

## 2019-06-13 NOTE — HISTORY OF PRESENT ILLNESS
[Disease: _____________________] : Disease: [unfilled] [T: ___] : T[unfilled] [N: ___] : N[unfilled] [M: ___] : M[unfilled] [AJCC Stage: ____] : AJCC Stage: [unfilled] [de-identified] :  2 cm Invasive carcinoma with ductal and lobular features, SBR 6/9, 0/2 SLN, ER 70%, CO weakly +,  HER-2 negative, KI-67 14%. Multifocal given the presence of foci of invasive carcinoma in additional margins, Oncotype = 31 (RR 19%) [de-identified] : Left breast cancer diagnosed at the age of 58\par 12/02/17 Screening mammography negative\par 12/14/18 Screening mammography showed a possible focal asymmetry in the left UOQ\par 01/03/19 Left breast diagnostic ultrasound showed an irregular hypoechoic mass at 1 o'clock axis measuring 0.6 x 1.5 x 0.8 cm, likely corresponding to a mammographic architectural distortion. Sonoguided core biopsy recommended.\par 01/04/19 Left breast 1 o'clock axis 4 cm FN sonoguided core biopsy showed infiltrating lobular carcinoma, SBR 5/9, ER/CA  %, Ki-67 10% and HER-2 negative.\par 01/14/19 MRI of the breast showed a 1.9 x 1.4 x 0.7 cm irregular mass with an irregular margin in the left breast at 2 o'clock axis, consistent with known carcinoma\par 01/24/19 Left breast 1 o'clock axis UOQ lumpectomy showed a 2 cm invasive carcinoma with ductal and lobular features, SBR 6/9 with 0/2 LN involved, ER 70%, CA very focal weak positivity, KI-67 14% and HER-2 negative. Left inferior margin revealed multiple foci of invasive carcinoma with lobular features, largest one measuring 4 mm, located at 1.5 mm from the final inferior margin, multiple foci of LCIS as well. Left anterior medial margin showed a 1.2 mm focus of invasive carcinoma with lobular features located at 3 mm from the margin and a 1.5 mm focus about 0.5 mm from the anterior medial margin\par 3/1/19 Oncotype showed a recurrence score of 31, which is correlated with a 19% risk of distant recurrence at 9 years and an absolute chemotherapy benefit of >15%\par 3/21/19 Taxotere and Cytoxan with Onpro every 3 weeks x 4 [de-identified] : Courtney presents to the office for an evaluation s/p TC # 4 three weeks ago. The last two cycles were more difficult as the bone pain and fatigue lasted for over a week. She also noted being more winded than before. She took Claritin and Advil and Percocet as needed which all helped but did not completely alleviate the pain.\par She has developed mild neuropathy over the last 2-3 cycles with some numbness in her big toes bilaterally and in the left index finger.\par She is feeling better now with resolving fatigue, although she is waking up at night because of the hot flashes and is now taking gabapentin 600 mg qhs.\par She continues to struggle with some anxiety associated with the cancer diagnosis and treatment but is coping well overall.  She does take the Xanax at night when she needs it to help her sleep and occasionally takes one at work when she is feeling overwhelmed.  \par She has good support overall and has become closer to her sister who has been very supportive. Previously they would argue a lot and this has been a positive change.\par \par HEALTH MAINTENANCE:\par Mammogram: 12/14/18 with abnormal finding consistent with invasive lobular carcinoma\par Pap smear: 11/18 normal. Patient states to 11/18 she has some spotting on two occasion, which may have been secondary to hormone replacement therapy.\par Bone density DEXA scan: none to date 4 years so\par Colonoscopy: 6/12/18 negative\par Genetic testing: Sister had breast cancer at age 26. [facility] genetic panel of 47 genes ordered 5/19\par

## 2019-06-13 NOTE — PHYSICAL EXAM
[Fully active, able to carry on all pre-disease performance without restriction] : Status 0 - Fully active, able to carry on all pre-disease performance without restriction [Normal] : grossly intact [de-identified] : W

## 2019-06-14 LAB — 25(OH)D3 SERPL-MCNC: 32.9 NG/ML

## 2019-06-20 PROCEDURE — 77295 3-D RADIOTHERAPY PLAN: CPT | Mod: 26

## 2019-06-20 PROCEDURE — 77334 RADIATION TREATMENT AID(S): CPT | Mod: 26

## 2019-06-20 PROCEDURE — 77300 RADIATION THERAPY DOSE PLAN: CPT | Mod: 26

## 2019-06-24 NOTE — DISCUSSION/SUMMARY
[Cancer Type / Location / Histology Subtype: ________] : Cancer Type / Location / Histology Subtype: [unfilled] [Diagnosis Date (year): ____] : Diagnosis Date (year): [unfilled] [I] : I [Surgery] : Surgery: Yes [Surgery Date(s) (year): ____] : Surgery Date(s) (year): [unfilled] [Surgical Procedure / Location / Findings: _________] : Surgical Procedure / Location / Findings: [unfilled] [Radiation] : Radiation: Yes [Body Area Treated: _________] : Body Area Treated: [unfilled] [End Date (year): ____] : End Date (year): [unfilled] [Follow up with Radiation MD in _____] : Follow up with Radiation MD in [unfilled] [FreeTextEntry8] : Ana Fowler

## 2019-06-26 PROCEDURE — 77280 THER RAD SIMULAJ FIELD SMPL: CPT | Mod: 26

## 2019-06-27 PROCEDURE — G6017: CPT

## 2019-06-28 PROCEDURE — G6017: CPT

## 2019-07-01 VITALS
RESPIRATION RATE: 16 BRPM | HEART RATE: 67 BPM | OXYGEN SATURATION: 98 % | TEMPERATURE: 98 F | BODY MASS INDEX: 34.82 KG/M2 | WEIGHT: 209 LBS | HEIGHT: 65 IN | SYSTOLIC BLOOD PRESSURE: 116 MMHG | DIASTOLIC BLOOD PRESSURE: 76 MMHG

## 2019-07-01 PROCEDURE — G6017: CPT

## 2019-07-01 NOTE — DISEASE MANAGEMENT
[Pathological] : TNM Stage: p [I] : I [FreeTextEntry4] : infiltrating lobular carcinoma [TTNM] : 1c [NTNM] : 0 [MTNM] : 0 [de-identified] : 795 cGy [de-identified] : 4,240 cGy [de-identified] : left breast

## 2019-07-01 NOTE — REVIEW OF SYSTEMS
[Edema Limbs: Grade 0] : Edema Limbs: Grade 0  [Fatigue: Grade 1 - Fatigue relieved by rest] : Fatigue: Grade 1 - Fatigue relieved by rest [Localized Edema: Grade 0] : Localized Edema: Grade 0  [Neck Edema: Grade 0] : Neck Edema: Grade 0 [Breast Pain: Grade 0] : Breast Pain: Grade 0 [Pruritus: Grade 0] : Pruritus: Grade 0 [Skin Atrophy: Grade 0] : Skin Atrophy: Grade 0 [Skin Hyperpigmentation: Grade 0] : Skin Hyperpigmentation: Grade 0 [Skin Induration: Grade 0] : Skin Induration: Grade 0 [Dermatitis Radiation: Grade 0] : Dermatitis Radiation: Grade 0

## 2019-07-02 PROCEDURE — G6017: CPT

## 2019-07-03 PROCEDURE — 77427 RADIATION TX MANAGEMENT X5: CPT

## 2019-07-03 PROCEDURE — G6017: CPT

## 2019-07-05 PROCEDURE — G6017: CPT

## 2019-07-08 VITALS
WEIGHT: 208.4 LBS | TEMPERATURE: 97.6 F | DIASTOLIC BLOOD PRESSURE: 78 MMHG | HEART RATE: 63 BPM | BODY MASS INDEX: 34.68 KG/M2 | OXYGEN SATURATION: 97 % | SYSTOLIC BLOOD PRESSURE: 123 MMHG | RESPIRATION RATE: 16 BRPM

## 2019-07-08 PROCEDURE — G6017: CPT

## 2019-07-08 NOTE — DISEASE MANAGEMENT
[FreeTextEntry4] : infiltrating lobular carcinoma [Pathological] : TNM Stage: p [MTNM] : 0 [NTNM] : 0 [TTNM] : 1c [I] : I [de-identified] : 4,240 cGy [de-identified] : 1,855 cGy [de-identified] : left breast

## 2019-07-08 NOTE — REVIEW OF SYSTEMS
[Edema Limbs: Grade 0] : Edema Limbs: Grade 0  [Fatigue: Grade 1 - Fatigue relieved by rest] : Fatigue: Grade 1 - Fatigue relieved by rest [Localized Edema: Grade 0] : Localized Edema: Grade 0  [Neck Edema: Grade 0] : Neck Edema: Grade 0 [Breast Pain: Grade 0] : Breast Pain: Grade 0 [Pruritus: Grade 0] : Pruritus: Grade 0 [Skin Atrophy: Grade 0] : Skin Atrophy: Grade 0 [Skin Hyperpigmentation: Grade 1 - Hyperpigmentation covering <10% BSA; no psychosocial impact] : Skin Hyperpigmentation: Grade 1 - Hyperpigmentation covering <10% BSA; no psychosocial impact [Skin Induration: Grade 0] : Skin Induration: Grade 0 [Dermatitis Radiation: Grade 0] : Dermatitis Radiation: Grade 0

## 2019-07-09 PROCEDURE — G6017: CPT

## 2019-07-10 PROCEDURE — G6017: CPT

## 2019-07-11 LAB
MISCELLANEOUS TEST: NORMAL
PROC NAME: NORMAL

## 2019-07-11 PROCEDURE — 77427 RADIATION TX MANAGEMENT X5: CPT

## 2019-07-11 PROCEDURE — G6017: CPT

## 2019-07-12 PROCEDURE — G6017: CPT

## 2019-07-15 VITALS
HEART RATE: 60 BPM | OXYGEN SATURATION: 96 % | HEIGHT: 65 IN | RESPIRATION RATE: 16 BRPM | SYSTOLIC BLOOD PRESSURE: 112 MMHG | BODY MASS INDEX: 34.66 KG/M2 | DIASTOLIC BLOOD PRESSURE: 79 MMHG | WEIGHT: 208 LBS

## 2019-07-15 PROCEDURE — G6017: CPT

## 2019-07-15 NOTE — PHYSICAL EXAM
[Breast Appearance] : normal in appearance [Symmetric] : breasts are symmetric [Breast Palpation Mass] : no palpable masses [Breast Abnormal Lactation (Galactorrhea)] : no nipple discharge [No UE Edema] : there is no upper extremity edema [Normal] : oriented to person, place and time, the affect was normal, the mood was normal and not anxious [de-identified] : hyperpigmentation right breast

## 2019-07-16 PROCEDURE — G6017: CPT

## 2019-07-17 PROCEDURE — G6017: CPT

## 2019-07-18 PROCEDURE — G6017: CPT

## 2019-07-18 PROCEDURE — 77427 RADIATION TX MANAGEMENT X5: CPT

## 2019-07-19 PROCEDURE — G6017: CPT

## 2019-08-06 ENCOUNTER — APPOINTMENT (OUTPATIENT)
Dept: FAMILY MEDICINE | Facility: CLINIC | Age: 59
End: 2019-08-06
Payer: COMMERCIAL

## 2019-08-06 VITALS
DIASTOLIC BLOOD PRESSURE: 78 MMHG | SYSTOLIC BLOOD PRESSURE: 116 MMHG | WEIGHT: 205 LBS | HEIGHT: 65 IN | HEART RATE: 72 BPM | BODY MASS INDEX: 34.16 KG/M2

## 2019-08-06 DIAGNOSIS — H93.19 TINNITUS, UNSPECIFIED EAR: ICD-10-CM

## 2019-08-06 PROCEDURE — 99396 PREV VISIT EST AGE 40-64: CPT | Mod: 25

## 2019-08-06 PROCEDURE — 36415 COLL VENOUS BLD VENIPUNCTURE: CPT

## 2019-08-06 NOTE — PLAN
[FreeTextEntry1] : VISION SCREENING\par CHECK ROUTINE LAB WORK\par FLU VACCINATION RECOMMENDED THIS FALL.\par TDAP AND SHINGRIX DISCUSSED\par HEALTHY DIET AND LIFESTYLE MODIFICATIONS\par HEALTHY WEIGHT LOSS \par EKG DECLINED - DONE RECENTLY WITH HEME/ONC\par FOLLOW UP WITH ALL SPECIALISTS AS DIRECTED.\par CALL WITH ANY QUESTIONS, CONCERNS OR CHANGES.

## 2019-08-06 NOTE — PHYSICAL EXAM
[No Acute Distress] : no acute distress [Well Nourished] : well nourished [Well-Appearing] : well-appearing [Normal Sclera/Conjunctiva] : normal sclera/conjunctiva [Normal Outer Ear/Nose] : the outer ears and nose were normal in appearance [Normal Oropharynx] : the oropharynx was normal [Supple] : supple [No Respiratory Distress] : no respiratory distress  [No Accessory Muscle Use] : no accessory muscle use [Clear to Auscultation] : lungs were clear to auscultation bilaterally [Regular Rhythm] : with a regular rhythm [Normal Rate] : normal rate  [Normal S1, S2] : normal S1 and S2 [No Murmur] : no murmur heard [Pedal Pulses Present] : the pedal pulses are present [No Edema] : there was no peripheral edema [Soft] : abdomen soft [Non Tender] : non-tender [Normal Bowel Sounds] : normal bowel sounds [No Joint Swelling] : no joint swelling [Grossly Normal Strength/Tone] : grossly normal strength/tone [Coordination Grossly Intact] : coordination grossly intact [No Focal Deficits] : no focal deficits [Normal Gait] : normal gait [Deep Tendon Reflexes (DTR)] : deep tendon reflexes were 2+ and symmetric [Speech Grossly Normal] : speech grossly normal [Memory Grossly Normal] : memory grossly normal [Normal Insight/Judgement] : insight and judgment were intact [PERRL] : pupils equal round and reactive to light [EOMI] : extraocular movements intact [Normal TMs] : both tympanic membranes were normal [No Lymphadenopathy] : no lymphadenopathy [No Rash] : no rash

## 2019-08-06 NOTE — HEALTH RISK ASSESSMENT
[Good] : ~his/her~ current health as good [Yes] : Yes [2 - 3 times a week (3 pts)] : 2 - 3  times a week (3 points) [No falls in past year] : Patient reported no falls in the past year [HIV test declined] : HIV test declined [Hepatitis C test declined] : Hepatitis C test declined [With Family] : lives with family [None] : None [# of Members in Household ___] :  household currently consist of [unfilled] member(s) [Employed] : employed [High School] : high school [] :  [# Of Children ___] : has [unfilled] children [Feels Safe at Home] : Feels safe at home [Fully functional (bathing, dressing, toileting, transferring, walking, feeding)] : Fully functional (bathing, dressing, toileting, transferring, walking, feeding) [Fully functional (using the telephone, shopping, preparing meals, housekeeping, doing laundry, using] : Fully functional and needs no help or supervision to perform IADLs (using the telephone, shopping, preparing meals, housekeeping, doing laundry, using transportation, managing medications and managing finances) [Reports normal functional visual acuity (ie: able to read med bottle)] : Reports normal functional visual acuity [Smoke Detector] : smoke detector [Carbon Monoxide Detector] : carbon monoxide detector [Safety elements used in home] : safety elements used in home [Seat Belt] :  uses seat belt [Sunscreen] : uses sunscreen [With Patient/Caregiver] : With Patient/Caregiver [Name: ___] : Health Care Proxy's Name: [unfilled]  [Designated Healthcare Proxy] : Designated healthcare proxy [Relationship: ___] : Relationship: [unfilled] [1 or 2 (0 pts)] : 1 or 2 (0 points) [Never (0 pts)] : Never (0 points) [No] : In the past 12 months have you used drugs other than those required for medical reasons? No [Patient reported PAP Smear was normal] : Patient reported PAP Smear was normal [Patient reported colonoscopy was normal] : Patient reported colonoscopy was normal [] : No [de-identified] : HEM/ONC - DR. TIANNA WHITE, OBGYN - DR. BRO GONZALES, GI - DR. KAJAL ERICKSON [de-identified] : SOCIAL [Audit-CScore] : 3 [de-identified] : SWIMMING [de-identified] : TRYING TO WATCH DIET AND EAT HEALTHIER [Change in mental status noted] : No change in mental status noted [Language] : denies difficulty with language [Behavior] : denies difficulty with behavior [Learning/Retaining New Information] : denies difficulty learning/retaining new information [Handling Complex Tasks] : denies difficulty handling complex tasks [Reasoning] : denies difficulty with reasoning [Spatial Ability and Orientation] : denies difficulty with spatial ability and orientation [High Risk Behavior] : no high risk behavior [Reports changes in hearing] : Reports no changes in hearing [Reports changes in vision] : Reports no changes in vision [Reports changes in dental health] : Reports no changes in dental health [MammogramDate] : 01/19 [PapSmearDate] : 12/18 [PapSmearComments] : DR. BRO GONZALES IN [place] [ColonoscopyDate] : 06/18 [ColonoscopyComments] : DR. KAJAL ERICKSON [de-identified] : COMPLETED 1 YEAR OF COLLEGE [de-identified] : WEARS GLASSES FOR READING [de-identified] : SAW ENT FOR EVALUATION OF TINNITUS [AdvancecareDate] : 08/19

## 2019-08-06 NOTE — HISTORY OF PRESENT ILLNESS
[FreeTextEntry1] : WELLNESS EXAM [de-identified] : MS. MATHUR IS A PLEASANT 59 YO PRESENTING FOR ANNUAL WELLNESS EXAM.  DIAGNOSED AT AGE 58 WITH INFILTRATING LOBULAR CARCINOMA OF THE LEFT BREAST, STATUS POST LEFT LUMPECTOMY, SENTINEL LYMPH NODE BIOPSY AND HAD ADJUVANT 4 CYCLES TAXOTERE AND CYTOXAN WITH DR. JUSTIN, NOW FINISHED WITH ADJUVANT RADIATION TO THE LEFT BREAST. DOING WELL OVERALL. NO CHEST PAIN, SHORTNESS OF BREATH, HEADACHES, DIZZINESS, GI OR URINARY COMPLAINTS. NO OTHER COMPLAINTS TODAY.\par DERMATOLOGY: SEEN ROUTINELY FOR SCREENING EXAM

## 2019-08-23 LAB
APPEARANCE: CLEAR
BILIRUBIN URINE: NEGATIVE
BLOOD URINE: NEGATIVE
CHOLEST SERPL-MCNC: 236 MG/DL
CHOLEST/HDLC SERPL: 2.6 RATIO
COLOR: NORMAL
GLUCOSE QUALITATIVE U: NEGATIVE
HDLC SERPL-MCNC: 91 MG/DL
KETONES URINE: NEGATIVE
LDLC SERPL CALC-MCNC: 131 MG/DL
LEUKOCYTE ESTERASE URINE: NEGATIVE
NITRITE URINE: NEGATIVE
PH URINE: 6.5
PROTEIN URINE: NEGATIVE
SPECIFIC GRAVITY URINE: 1.01
T4 FREE SERPL-MCNC: 1.2 NG/DL
TRIGL SERPL-MCNC: 70 MG/DL
TSH SERPL-ACNC: 0.65 UIU/ML
UROBILINOGEN URINE: NORMAL

## 2019-09-10 ENCOUNTER — OUTPATIENT (OUTPATIENT)
Dept: OUTPATIENT SERVICES | Facility: HOSPITAL | Age: 59
LOS: 1 days | Discharge: HOME | End: 2019-09-10

## 2019-09-10 DIAGNOSIS — C50.919 MALIGNANT NEOPLASM OF UNSPECIFIED SITE OF UNSPECIFIED FEMALE BREAST: ICD-10-CM

## 2019-09-10 DIAGNOSIS — Z98.890 OTHER SPECIFIED POSTPROCEDURAL STATES: Chronic | ICD-10-CM

## 2019-09-11 ENCOUNTER — APPOINTMENT (OUTPATIENT)
Dept: RADIATION ONCOLOGY | Facility: CLINIC | Age: 59
End: 2019-09-11
Payer: COMMERCIAL

## 2019-09-11 VITALS
OXYGEN SATURATION: 97 % | DIASTOLIC BLOOD PRESSURE: 70 MMHG | RESPIRATION RATE: 16 BRPM | WEIGHT: 207 LBS | SYSTOLIC BLOOD PRESSURE: 115 MMHG | BODY MASS INDEX: 34.49 KG/M2 | HEART RATE: 81 BPM | HEIGHT: 65 IN | TEMPERATURE: 98 F

## 2019-09-11 DIAGNOSIS — Z92.3 PERSONAL HISTORY OF IRRADIATION: ICD-10-CM

## 2019-09-11 PROCEDURE — 99024 POSTOP FOLLOW-UP VISIT: CPT

## 2019-09-11 NOTE — LETTER GREETING
[Dear Doctor] : Dear Doctor, [Follow-Up] : Your patient, [unfilled] was seen in my office today for follow-up [Please see my note below.] : Please see my note below. [FreeTextEntry2] : Maame Nieves MD

## 2019-09-11 NOTE — LETTER CLOSING
[Sincerely yours,] : Sincerely yours, [FreeTextEntry3] : Christian Lorenzana MD\par Physician in Chief\par Department of Radiation Medicine\par [facility]\par [facility]\par \par  of Radiation Medicine\par [facility]\par at  hospitals/[facility]\par \par Radiation \par [facility]/\par [facility] [place]\par [address]\par [place], [place] [address]\par \par Tel: [phone]\par Fax: [phone]\par

## 2019-09-11 NOTE — PHYSICAL EXAM
[Normal] : no palpable adenopathy [de-identified] : left breast with modest edema and heterogenous hyperpigmentation

## 2019-09-11 NOTE — DISEASE MANAGEMENT
[Pathological] : TNM Stage: p [IA] : IA [FreeTextEntry4] : infiltrating lobular carcinoma [TTNM] : 1c [NTNM] : 0 [MTNM] : 0 [de-identified] : 4,240 cGy [de-identified] : left breast

## 2019-09-11 NOTE — REVIEW OF SYSTEMS
[Negative] : Allergic/Immunologic [de-identified] : notes modest swelling of left breast and hyperpigmentation [de-identified] : s/p chemotherapy

## 2019-09-11 NOTE — HISTORY OF PRESENT ILLNESS
[FreeTextEntry1] : This 58 year-old woman with infiltrating lobular carcinoma of the left breast (pT1c N0, ER+TN+Her2-, Oncotype 31), status post left lumpectomy/SLNBx and adjuvant 4 cycles Taxotere and Cytoxan with Dr. Nieves,  competed adjuvant radiation therapy (4,240 cGy) to the left breast.  Denies fatigue, pain.  Skin right breast with moderate hyperpigmentation, no desquamation, moisturizing with CeraVe.  She completed chemotherapy with Dr. Nieves prior to radiation therapy, will visit tomorrow to discuss hormonal therapy.\par

## 2019-09-12 ENCOUNTER — RESULT REVIEW (OUTPATIENT)
Age: 59
End: 2019-09-12

## 2019-09-12 ENCOUNTER — APPOINTMENT (OUTPATIENT)
Dept: HEMATOLOGY ONCOLOGY | Facility: CLINIC | Age: 59
End: 2019-09-12
Payer: COMMERCIAL

## 2019-09-12 VITALS
HEART RATE: 55 BPM | DIASTOLIC BLOOD PRESSURE: 82 MMHG | TEMPERATURE: 98 F | BODY MASS INDEX: 34.34 KG/M2 | RESPIRATION RATE: 16 BRPM | SYSTOLIC BLOOD PRESSURE: 120 MMHG | WEIGHT: 206.35 LBS | OXYGEN SATURATION: 100 %

## 2019-09-12 LAB
BASOPHILS # BLD AUTO: 0 K/UL — SIGNIFICANT CHANGE UP (ref 0–0.2)
BASOPHILS NFR BLD AUTO: 1.2 % — SIGNIFICANT CHANGE UP (ref 0–2)
EOSINOPHIL # BLD AUTO: 0.1 K/UL — SIGNIFICANT CHANGE UP (ref 0–0.5)
EOSINOPHIL NFR BLD AUTO: 2.5 % — SIGNIFICANT CHANGE UP (ref 0–6)
HCT VFR BLD CALC: 43.1 % — SIGNIFICANT CHANGE UP (ref 34.5–45)
HGB BLD-MCNC: 14.1 G/DL — SIGNIFICANT CHANGE UP (ref 11.5–15.5)
LYMPHOCYTES # BLD AUTO: 1.3 K/UL — SIGNIFICANT CHANGE UP (ref 1–3.3)
LYMPHOCYTES # BLD AUTO: 40.5 % — SIGNIFICANT CHANGE UP (ref 13–44)
MCHC RBC-ENTMCNC: 29.5 PG — SIGNIFICANT CHANGE UP (ref 27–34)
MCHC RBC-ENTMCNC: 32.8 G/DL — SIGNIFICANT CHANGE UP (ref 32–36)
MCV RBC AUTO: 89.8 FL — SIGNIFICANT CHANGE UP (ref 80–100)
MONOCYTES # BLD AUTO: 0.2 K/UL — SIGNIFICANT CHANGE UP (ref 0–0.9)
MONOCYTES NFR BLD AUTO: 6.8 % — SIGNIFICANT CHANGE UP (ref 2–14)
NEUTROPHILS # BLD AUTO: 1.6 K/UL — LOW (ref 1.8–7.4)
NEUTROPHILS NFR BLD AUTO: 49.1 % — SIGNIFICANT CHANGE UP (ref 43–77)
PLATELET # BLD AUTO: 211 K/UL — SIGNIFICANT CHANGE UP (ref 150–400)
RBC # BLD: 4.8 M/UL — SIGNIFICANT CHANGE UP (ref 3.8–5.2)
RBC # FLD: 13.1 % — SIGNIFICANT CHANGE UP (ref 10.3–14.5)
WBC # BLD: 3.2 K/UL — LOW (ref 3.8–10.5)
WBC # FLD AUTO: 3.2 K/UL — LOW (ref 3.8–10.5)

## 2019-09-12 PROCEDURE — 99215 OFFICE O/P EST HI 40 MIN: CPT

## 2019-09-12 RX ORDER — ANASTROZOLE TABLETS 1 MG/1
1 TABLET ORAL
Qty: 30 | Refills: 6 | Status: DISCONTINUED | COMMUNITY
Start: 2019-09-12 | End: 2019-09-12

## 2019-09-13 NOTE — HISTORY OF PRESENT ILLNESS
[Disease: _____________________] : Disease: [unfilled] [N: ___] : N[unfilled] [T: ___] : T[unfilled] [AJCC Stage: ____] : AJCC Stage: [unfilled] [M: ___] : M[unfilled] [de-identified] : Left breast cancer diagnosed at the age of 58\par 12/02/17 Screening mammography negative\par 12/14/18 Screening mammography showed a possible focal asymmetry in the left UOQ\par 01/03/19 Left breast diagnostic ultrasound showed an irregular hypoechoic mass at 1 o'clock axis measuring 0.6 x 1.5 x 0.8 cm, likely corresponding to a mammographic architectural distortion. Sonoguided core biopsy recommended.\par 01/04/19 Left breast 1 o'clock axis 4 cm FN sonoguided core biopsy showed infiltrating lobular carcinoma, SBR 5/9, ER/[place]  %, Ki-67 10% and HER-2 negative.\par 01/14/19 MRI of the breast showed a 1.9 x 1.4 x 0.7 cm irregular mass with an irregular margin in the left breast at 2 o'clock axis, consistent with known carcinoma\par 01/24/19 Left breast 1 o'clock axis UOQ lumpectomy showed a 2 cm invasive carcinoma with ductal and lobular features, SBR 6/9 with 0/2 LN involved, ER 70%, [place] very focal weak positivity, KI-67 14% and HER-2 negative. Left inferior margin revealed multiple foci of invasive carcinoma with lobular features, largest one measuring 4 mm, located at 1.5 mm from the final inferior margin, multiple foci of LCIS as well. Left anterior medial margin showed a 1.2 mm focus of invasive carcinoma with lobular features located at 3 mm from the margin and a 1.5 mm focus about 0.5 mm from the anterior medial margin\par 3/1/19 Oncotype showed a recurrence score of 31, which is correlated with a 19% risk of distant recurrence at 9 years and an absolute chemotherapy benefit of >15%\par 3/21/19 Taxotere and Cytoxan with Onpro every 3 weeks x 4\par 06/26/19 - 7/18/19 Adjuvant Radiation with Dr. Lorenzana\par 9/19 Start anastrozole [de-identified] :  2 cm Invasive carcinoma with ductal and lobular features, SBR 6/9, 0/2 SLN, ER 70%, [place] weakly +, HER-2 negative, KI-67 14%. Multifocal given the presence of foci of invasive carcinoma in additional margins, Oncotype = 31 (RR 19%) [de-identified] : Courtney presents to the office for an evaluation s/p completing four cycles of TC in 6/19 and  RT with Dr. Lorenzana from 06/26/19-7/18/19\par She is doing well and is almost back to her normal level of energy.\par She still has mild neuropathy in her big toes but it is intermittent and she has no residual numbness in her fingers\par Anxiety has been much better overall but she did find it was triggered again at her visit to Radiation Medicine yesterday.  She does not feel it today here at [facility].\par She has good support overall and has become closer to her sister who has been very supportive. Previously they would argue a lot and this has been a positive change.\par She is filing for divorce. The breast cancer diagnosis brought out the lack of support in her marriage.\par \par HEALTH MAINTENANCE:\par Mammogram: 12/14/18 with abnormal finding consistent with invasive lobular carcinoma\par Pap smear: 11/18 normal. Patient states to 11/18 she has some spotting on two occasion, which may have been secondary to hormone replacement therapy.\par Bone density DEXA scan: none to date 4 years so\par Colonoscopy: 6/12/18 negative\par Genetic testing: Sister had breast cancer at age 26. [facility] genetic panel of 47 genes ordered 5/19\par

## 2019-09-17 ENCOUNTER — FORM ENCOUNTER (OUTPATIENT)
Age: 59
End: 2019-09-17

## 2019-09-17 LAB
25(OH)D3 SERPL-MCNC: 38.4 NG/ML
ALBUMIN SERPL ELPH-MCNC: 4.6 G/DL
ALP BLD-CCNC: 91 U/L
ALT SERPL-CCNC: 21 U/L
ANION GAP SERPL CALC-SCNC: 14 MMOL/L
AST SERPL-CCNC: 25 U/L
BILIRUB SERPL-MCNC: 0.3 MG/DL
BUN SERPL-MCNC: 9 MG/DL
CALCIUM SERPL-MCNC: 10.1 MG/DL
CHLORIDE SERPL-SCNC: 104 MMOL/L
CO2 SERPL-SCNC: 25 MMOL/L
CREAT SERPL-MCNC: 0.83 MG/DL
GLUCOSE SERPL-MCNC: 90 MG/DL
POTASSIUM SERPL-SCNC: 4.4 MMOL/L
PROT SERPL-MCNC: 7.4 G/DL
SODIUM SERPL-SCNC: 143 MMOL/L

## 2019-09-18 ENCOUNTER — OUTPATIENT (OUTPATIENT)
Dept: OUTPATIENT SERVICES | Facility: HOSPITAL | Age: 59
LOS: 1 days | End: 2019-09-18
Payer: COMMERCIAL

## 2019-09-18 ENCOUNTER — APPOINTMENT (OUTPATIENT)
Dept: RADIOLOGY | Facility: CLINIC | Age: 59
End: 2019-09-18
Payer: COMMERCIAL

## 2019-09-18 DIAGNOSIS — C50.912 MALIGNANT NEOPLASM OF UNSPECIFIED SITE OF LEFT FEMALE BREAST: ICD-10-CM

## 2019-09-18 DIAGNOSIS — Z98.890 OTHER SPECIFIED POSTPROCEDURAL STATES: Chronic | ICD-10-CM

## 2019-09-18 DIAGNOSIS — Z00.00 ENCOUNTER FOR GENERAL ADULT MEDICAL EXAMINATION WITHOUT ABNORMAL FINDINGS: ICD-10-CM

## 2019-09-18 PROCEDURE — 77080 DXA BONE DENSITY AXIAL: CPT | Mod: 26

## 2019-09-18 PROCEDURE — 77080 DXA BONE DENSITY AXIAL: CPT

## 2019-09-20 ENCOUNTER — RX RENEWAL (OUTPATIENT)
Age: 59
End: 2019-09-20

## 2019-11-11 ENCOUNTER — FORM ENCOUNTER (OUTPATIENT)
Age: 59
End: 2019-11-11

## 2019-11-12 ENCOUNTER — APPOINTMENT (OUTPATIENT)
Dept: NUCLEAR MEDICINE | Facility: CLINIC | Age: 59
End: 2019-11-12
Payer: COMMERCIAL

## 2019-11-12 ENCOUNTER — OUTPATIENT (OUTPATIENT)
Dept: OUTPATIENT SERVICES | Facility: HOSPITAL | Age: 59
LOS: 1 days | End: 2019-11-12

## 2019-11-12 DIAGNOSIS — C50.912 MALIGNANT NEOPLASM OF UNSPECIFIED SITE OF LEFT FEMALE BREAST: ICD-10-CM

## 2019-11-12 DIAGNOSIS — Z98.890 OTHER SPECIFIED POSTPROCEDURAL STATES: Chronic | ICD-10-CM

## 2019-11-12 PROCEDURE — 78320: CPT | Mod: 26

## 2019-11-12 PROCEDURE — 78306 BONE IMAGING WHOLE BODY: CPT | Mod: 26

## 2019-12-09 ENCOUNTER — RX RENEWAL (OUTPATIENT)
Age: 59
End: 2019-12-09

## 2019-12-11 ENCOUNTER — RX RENEWAL (OUTPATIENT)
Age: 59
End: 2019-12-11

## 2019-12-11 ENCOUNTER — OUTPATIENT (OUTPATIENT)
Dept: OUTPATIENT SERVICES | Facility: HOSPITAL | Age: 59
LOS: 1 days | Discharge: HOME | End: 2019-12-11

## 2019-12-11 DIAGNOSIS — Z98.890 OTHER SPECIFIED POSTPROCEDURAL STATES: Chronic | ICD-10-CM

## 2019-12-11 DIAGNOSIS — C50.919 MALIGNANT NEOPLASM OF UNSPECIFIED SITE OF UNSPECIFIED FEMALE BREAST: ICD-10-CM

## 2019-12-13 ENCOUNTER — APPOINTMENT (OUTPATIENT)
Dept: HEMATOLOGY ONCOLOGY | Facility: CLINIC | Age: 59
End: 2019-12-13

## 2020-02-24 ENCOUNTER — OUTPATIENT (OUTPATIENT)
Dept: OUTPATIENT SERVICES | Facility: HOSPITAL | Age: 60
LOS: 1 days | Discharge: HOME | End: 2020-02-24

## 2020-02-24 DIAGNOSIS — Z98.890 OTHER SPECIFIED POSTPROCEDURAL STATES: Chronic | ICD-10-CM

## 2020-02-24 DIAGNOSIS — C50.919 MALIGNANT NEOPLASM OF UNSPECIFIED SITE OF UNSPECIFIED FEMALE BREAST: ICD-10-CM

## 2020-02-25 ENCOUNTER — APPOINTMENT (OUTPATIENT)
Dept: HEMATOLOGY ONCOLOGY | Facility: CLINIC | Age: 60
End: 2020-02-25
Payer: COMMERCIAL

## 2020-02-25 VITALS
HEART RATE: 64 BPM | RESPIRATION RATE: 16 BRPM | BODY MASS INDEX: 34.12 KG/M2 | DIASTOLIC BLOOD PRESSURE: 78 MMHG | SYSTOLIC BLOOD PRESSURE: 114 MMHG | OXYGEN SATURATION: 100 % | TEMPERATURE: 97.6 F | WEIGHT: 205.03 LBS

## 2020-02-25 PROCEDURE — 99214 OFFICE O/P EST MOD 30 MIN: CPT

## 2020-02-26 NOTE — HISTORY OF PRESENT ILLNESS
[T: ___] : T[unfilled] [Disease: _____________________] : Disease: [unfilled] [N: ___] : N[unfilled] [M: ___] : M[unfilled] [AJCC Stage: ____] : AJCC Stage: [unfilled] [de-identified] : Left breast cancer diagnosed at the age of 58\par 12/02/17 Screening mammography negative\par 12/14/18 Screening mammography showed a possible focal asymmetry in the left UOQ\par 01/03/19 Left breast diagnostic ultrasound showed an irregular hypoechoic mass at 1 o'clock axis measuring 0.6 x 1.5 x 0.8 cm, likely corresponding to a mammographic architectural distortion. Sonoguided core biopsy recommended.\par 01/04/19 Left breast 1 o'clock axis 4 cm FN sonoguided core biopsy showed infiltrating lobular carcinoma, SBR 5/9, ER/[place]  %, Ki-67 10% and HER-2 negative.\par 01/14/19 MRI of the breast showed a 1.9 x 1.4 x 0.7 cm irregular mass with an irregular margin in the left breast at 2 o'clock axis, consistent with known carcinoma\par 01/24/19 Left breast 1 o'clock axis UOQ lumpectomy showed a 2 cm invasive carcinoma with ductal and lobular features, SBR 6/9 with 0/2 LN involved, ER 70%, [place] very focal weak positivity, KI-67 14% and HER-2 negative. Left inferior margin revealed multiple foci of invasive carcinoma with lobular features, largest one measuring 4 mm, located at 1.5 mm from the final inferior margin, multiple foci of LCIS as well. Left anterior medial margin showed a 1.2 mm focus of invasive carcinoma with lobular features located at 3 mm from the margin and a 1.5 mm focus about 0.5 mm from the anterior medial margin\par 3/1/19 Oncotype showed a recurrence score of 31, which is correlated with a 19% risk of distant recurrence at 9 years and an absolute chemotherapy benefit of >15%\par 3/21/19 Taxotere and Cytoxan with Onpro every 3 weeks x 4\par 06/26/19 - 7/18/19 Adjuvant Radiation with Dr. Lorenzana\par 9/2019 Start anastrozole [de-identified] :  2 cm Invasive carcinoma with ductal and lobular features, SBR 6/9, 0/2 SLN, ER 70%, [place] weakly +, HER-2 negative, KI-67 14%. Multifocal given the presence of foci of invasive carcinoma in additional margins, Oncotype = 31 (RR 19%) [de-identified] : Courtney presents to the office for an evaluation s/p completing four cycles of TC in 6/19 and  RT with Dr. Lorenzana from 06/26/19-7/18/19\par She is here for an evaluation s/p starting on Anastrozole in 09/2019. She reports experiencing diffuse body aches for about 2 weeks after starting the anastrozole and spontaneously resolved on it's own. After 2 months of being on the medication she reports developing bilateral hip pain R>>L. Pain usually at it's worse during the night when she tries to sleep on her right side with pain scale o 7-8/10. She has been managing her pain by taking 2-3 Advil which then allows her to get some sleep. The pain in the right hip often times radiates down her right femur or gluteal region and at times very sensitive to the touch. She also mentions experiencing more stiffness in both her hips R>>L in morning, which tends to get better with ambulation. She reports seeing an Orthopedist who gave her a Medrol pack which seemed to have alleviated her right hip pain x 2 weeks.\par Bone scan in 11/19 showed DJD changes in the left facet of L4 and in the major joints. Left mandibular focus, possibly dental in etiology- she has not seen her dentist yet but believes that the dental bridge that was put in was when the bone scan is referring to.\par She occasionally gets some numbness in her toes, but no neuropathic pain in her fingers or toes.\par Hot flashes are intermittent in nature and gabapentin helps are manage her symptoms at night. \par She is filing for divorce. The breast cancer diagnosis brought out the lack of support in her marriage.\par She is currently working part time at [employer], which brings her onelia staying up to date with fashion.\par \par HEALTH MAINTENANCE:\par Mammogram: 02/20 official report is pending.\par Pap smear: 1/20 negative..\par Bone density DEXA scan: 09/18/19 showed T scores of -0.6 in the spine, -0.4 in femoral neck and 0.1 hip\par Colonoscopy: 6/12/18 negative\par Genetic testing: Sister had breast cancer at age 26. [facility] genetic panel of 47 genes ordered 5/19\par

## 2020-06-15 ENCOUNTER — OUTPATIENT (OUTPATIENT)
Dept: OUTPATIENT SERVICES | Facility: HOSPITAL | Age: 60
LOS: 1 days | Discharge: HOME | End: 2020-06-15

## 2020-06-15 DIAGNOSIS — C50.919 MALIGNANT NEOPLASM OF UNSPECIFIED SITE OF UNSPECIFIED FEMALE BREAST: ICD-10-CM

## 2020-06-15 DIAGNOSIS — Z98.890 OTHER SPECIFIED POSTPROCEDURAL STATES: Chronic | ICD-10-CM

## 2020-06-18 ENCOUNTER — APPOINTMENT (OUTPATIENT)
Dept: HEMATOLOGY ONCOLOGY | Facility: CLINIC | Age: 60
End: 2020-06-18
Payer: COMMERCIAL

## 2020-06-18 PROCEDURE — 99214 OFFICE O/P EST MOD 30 MIN: CPT | Mod: 95

## 2020-06-18 NOTE — HISTORY OF PRESENT ILLNESS
[Home] : at home, [unfilled] , at the time of the visit. [Verbal consent obtained from patient] : the patient, [unfilled] [Medical Office: ([facility])___] : at the medical office located in  [Disease: _____________________] : Disease: [unfilled] [T: ___] : T[unfilled] [N: ___] : N[unfilled] [M: ___] : M[unfilled] [AJCC Stage: ____] : AJCC Stage: [unfilled] [de-identified] : Left breast cancer diagnosed at the age of 58\par 12/02/17 Screening mammography negative\par 12/14/18 Screening mammography showed a possible focal asymmetry in the left UOQ\par 01/03/19 Left breast diagnostic ultrasound showed an irregular hypoechoic mass at 1 o'clock axis measuring 0.6 x 1.5 x 0.8 cm, likely corresponding to a mammographic architectural distortion. Sonoguided core biopsy recommended.\par 01/04/19 Left breast 1 o'clock axis 4 cm FN sonoguided core biopsy showed infiltrating lobular carcinoma, SBR 5/9, ER/[place]  %, Ki-67 10% and HER-2 negative.\par 01/14/19 MRI of the breast showed a 1.9 x 1.4 x 0.7 cm irregular mass with an irregular margin in the left breast at 2 o'clock axis, consistent with known carcinoma\par 01/24/19 Left breast 1 o'clock axis UOQ lumpectomy showed a 2 cm invasive carcinoma with ductal and lobular features, SBR 6/9 with 0/2 LN involved, ER 70%, [place] very focal weak positivity, KI-67 14% and HER-2 negative. Left inferior margin revealed multiple foci of invasive carcinoma with lobular features, largest one measuring 4 mm, located at 1.5 mm from the final inferior margin, multiple foci of LCIS as well. Left anterior medial margin showed a 1.2 mm focus of invasive carcinoma with lobular features located at 3 mm from the margin and a 1.5 mm focus about 0.5 mm from the anterior medial margin\par 3/1/19 Oncotype showed a recurrence score of 31, which is correlated with a 19% risk of distant recurrence at 9 years and an absolute chemotherapy benefit of >15%\par 3/21/19 Taxotere and Cytoxan with Onpro every 3 weeks x 4\par 06/26/19 - 7/18/19 Adjuvant Radiation with Dr. Lorenzana\par 9/2019 Start anastrozole [de-identified] : FLAVIA is seen today for a virtual follow up visit, in light of the COVID-19 pandemic crisis\par She continues to do fairly well on Anastrozole which she has been on since in 09/2019. \par She continues to experience joint pain and stiffness, but is slightly better and tolerable than a few months ago, when she first started the medication.\par She also mentions taking Tumeric and Naproxen as needed which helps little more.\par She was evaluated by an orthopedist for the bilateral hip pain R>>L which was keeping her up at nights and was told it's sciatica. She was started on Medrol pack that alleviated her acute pain in a short time and she has since been doing well. She still some knee pain, but is tolerable since she has been on tumeric and exercising daily .\par The hot flashes are stable on gabapentin 600mg QHS. She occasionally gets some numbness in her toes, but no neuropathic pain in her fingers or toes.\par She is currently working from home due to COVID, which she is happy about as she is able to focus on her health and wellbeing.\par She is filing for divorce. The breast cancer diagnosis brought out the lack of support in her marriage.\par \par \par HEALTH MAINTENANCE:\par Mammogram: 02/20 official report is pending.\par Pap smear: 1/20 negative..\par Bone density DEXA scan: 09/18/19 showed T scores of -0.6 in the spine, -0.4 in femoral neck and 0.1 hip\par Colonoscopy: 6/12/18 negative\par Genetic testing: Sister had breast cancer at age 26. [facility] genetic panel of 47 genes ordered 5/19\par  [de-identified] :  2 cm Invasive carcinoma with ductal and lobular features, SBR 6/9, 0/2 SLN, ER 70%, SD weakly +, HER-2 negative, KI-67 14%. Multifocal given the presence of foci of invasive carcinoma in additional margins, Oncotype = 31 (RR 19%)

## 2020-06-18 NOTE — PHYSICAL EXAM
[Fully active, able to carry on all pre-disease performance without restriction] : Status 0 - Fully active, able to carry on all pre-disease performance without restriction [Normal] : clear to auscultation bilaterally, no dullness, no wheezing [de-identified] : Normal respiratory effort. Speaking in full sentences without difficulty

## 2020-08-13 NOTE — DISEASE MANAGEMENT
[Pathological] : TNM Stage: p no pelvic pain [FreeTextEntry4] : infiltrating lobular carcinoma [TTNM] : 1c [NTNM] : 0 [MTNM] : 0 [I] : I [de-identified] : 3,180 cGy [de-identified] : 4,240 cGy [de-identified] : left breast

## 2020-11-21 ENCOUNTER — TRANSCRIPTION ENCOUNTER (OUTPATIENT)
Age: 60
End: 2020-11-21

## 2020-12-15 ENCOUNTER — OUTPATIENT (OUTPATIENT)
Dept: OUTPATIENT SERVICES | Facility: HOSPITAL | Age: 60
LOS: 1 days | Discharge: HOME | End: 2020-12-15

## 2020-12-15 DIAGNOSIS — C50.919 MALIGNANT NEOPLASM OF UNSPECIFIED SITE OF UNSPECIFIED FEMALE BREAST: ICD-10-CM

## 2020-12-15 DIAGNOSIS — Z98.890 OTHER SPECIFIED POSTPROCEDURAL STATES: Chronic | ICD-10-CM

## 2020-12-17 ENCOUNTER — APPOINTMENT (OUTPATIENT)
Dept: HEMATOLOGY ONCOLOGY | Facility: CLINIC | Age: 60
End: 2020-12-17
Payer: COMMERCIAL

## 2020-12-17 DIAGNOSIS — M25.551 PAIN IN RIGHT HIP: ICD-10-CM

## 2020-12-17 PROCEDURE — 99214 OFFICE O/P EST MOD 30 MIN: CPT | Mod: 95

## 2020-12-17 NOTE — PHYSICAL EXAM
[Fully active, able to carry on all pre-disease performance without restriction] : Status 0 - Fully active, able to carry on all pre-disease performance without restriction [Normal] : affect appropriate [de-identified] : Normal respiratory effort. Speaking in full sentences without difficulty

## 2020-12-17 NOTE — HISTORY OF PRESENT ILLNESS
[Home] : at home, [unfilled] , at the time of the visit. [Medical Office: ([facility])___] : at the medical office located in  [Verbal consent obtained from patient] : the patient, [unfilled] [Disease: _____________________] : Disease: [unfilled] [T: ___] : T[unfilled] [N: ___] : N[unfilled] [M: ___] : M[unfilled] [AJCC Stage: ____] : AJCC Stage: [unfilled] [de-identified] : Left breast cancer diagnosed at the age of 58\par 12/02/17 Screening mammography negative\par 12/14/18 Screening mammography showed a possible focal asymmetry in the left UOQ\par 01/03/19 Left breast diagnostic ultrasound showed an irregular hypoechoic mass at 1 o'clock axis measuring 0.6 x 1.5 x 0.8 cm, likely corresponding to a mammographic architectural distortion. Sonoguided core biopsy recommended.\par 01/04/19 Left breast 1 o'clock axis 4 cm FN sonoguided core biopsy showed infiltrating lobular carcinoma, SBR 5/9, ER/[place]  %, Ki-67 10% and HER-2 negative.\par 01/14/19 MRI of the breast showed a 1.9 x 1.4 x 0.7 cm irregular mass with an irregular margin in the left breast at 2 o'clock axis, consistent with known carcinoma\par 01/24/19 Left breast 1 o'clock axis UOQ lumpectomy showed a 2 cm invasive carcinoma with ductal and lobular features, SBR 6/9 with 0/2 LN involved, ER 70%, [place] very focal weak positivity, KI-67 14% and HER-2 negative. Left inferior margin revealed multiple foci of invasive carcinoma with lobular features, largest one measuring 4 mm, located at 1.5 mm from the final inferior margin, multiple foci of LCIS as well. Left anterior medial margin showed a 1.2 mm focus of invasive carcinoma with lobular features located at 3 mm from the margin and a 1.5 mm focus about 0.5 mm from the anterior medial margin\par 3/1/19 Oncotype showed a recurrence score of 31, which is correlated with a 19% risk of distant recurrence at 9 years and an absolute chemotherapy benefit of >15%\par 3/21/19 Taxotere and Cytoxan with Onpro every 3 weeks x 4\par 06/26/19 - 7/18/19 Adjuvant Radiation with Dr. Lorenzana\par 9/2019 Start anastrozole [de-identified] :  2 cm Invasive carcinoma with ductal and lobular features, SBR 6/9, 0/2 SLN, ER 70%, [place] weakly +, HER-2 negative, KI-67 14%. Multifocal given the presence of foci of invasive carcinoma in additional margins, Oncotype = 31 (RR 19%) [de-identified] : Courtney is seen today for a virtual follow up visit, in light of the COVID-19 pandemic crisis.\par She continues to do fairly well on Anastrozole which she has been on since in 09/2019. \par She is overall doing well with no acute complaint at this time. \par The joint pain and stiffness is significantly better so she continues to take Tumeric daily and Naproxen only as needed.\par She state that since she has been exercising more regularly she is less stiff and the knee pain is almost completely gone.\par She take Naproxen only for Muscle pain which come and goes at times.\par She still gets hot flashes, very intermittently but tends to get more hot flashes with intake of any caffeinated beverages, so she has been avoiding as much as possible. \par She continues to take Gabapentin 600 mg as needed. She occasionally gets some numbness in her toes, but no neuropathic pain in her fingers or toes.\par She is currently working from home due to COVID, which she is happy about as she is able to focus on her health and wellbeing.\par She is filing for divorce. The breast cancer diagnosis brought out the lack of support in her marriage\par \par \par HEALTH MAINTENANCE:\par Mammogram: 02/20 official report is pending.\par Pap smear: 1/20 negative..\par Bone density DEXA scan: 09/18/19 showed T scores of -0.6 in the spine, -0.4 in femoral neck and 0.1 hip\par Colonoscopy: 6/12/18 negative\par Genetic testing: Sister had breast cancer at age 26. [facility] genetic panel of 47 genes ordered 5/19\par

## 2021-01-07 NOTE — OB HISTORY
[Menarche Age: ____] : age at menarche was [unfilled] [Menopause Age: ____] : age at menopause was [unfilled] [___] : Full Term: [unfilled] Speaking Coherently

## 2021-01-22 ENCOUNTER — LABORATORY RESULT (OUTPATIENT)
Age: 61
End: 2021-01-22

## 2021-01-25 LAB
25(OH)D3 SERPL-MCNC: 49.2 NG/ML
ALBUMIN SERPL ELPH-MCNC: 4.3 G/DL
ALP BLD-CCNC: 90 U/L
ALT SERPL-CCNC: 10 U/L
ANION GAP SERPL CALC-SCNC: 12 MMOL/L
AST SERPL-CCNC: 17 U/L
BASOPHILS # BLD AUTO: 0.03 K/UL
BASOPHILS NFR BLD AUTO: 1 %
BILIRUB SERPL-MCNC: 0.3 MG/DL
BUN SERPL-MCNC: 10 MG/DL
CALCIUM SERPL-MCNC: 9.8 MG/DL
CHLORIDE SERPL-SCNC: 107 MMOL/L
CHOLEST SERPL-MCNC: 231 MG/DL
CO2 SERPL-SCNC: 24 MMOL/L
CREAT SERPL-MCNC: 0.97 MG/DL
EOSINOPHIL # BLD AUTO: 0.07 K/UL
EOSINOPHIL NFR BLD AUTO: 2.4 %
ESTIMATED AVERAGE GLUCOSE: 108 MG/DL
GLUCOSE SERPL-MCNC: 87 MG/DL
HBA1C MFR BLD HPLC: 5.4 %
HCT VFR BLD CALC: 41.1 %
HDLC SERPL-MCNC: 101 MG/DL
HGB BLD-MCNC: 13.2 G/DL
IMM GRANULOCYTES NFR BLD AUTO: 0.3 %
LDLC SERPL CALC-MCNC: 121 MG/DL
LYMPHOCYTES # BLD AUTO: 1.44 K/UL
LYMPHOCYTES NFR BLD AUTO: 48.8 %
MAN DIFF?: NORMAL
MCHC RBC-ENTMCNC: 28.9 PG
MCHC RBC-ENTMCNC: 32.1 GM/DL
MCV RBC AUTO: 89.9 FL
MONOCYTES # BLD AUTO: 0.27 K/UL
MONOCYTES NFR BLD AUTO: 9.2 %
NEUTROPHILS # BLD AUTO: 1.13 K/UL
NEUTROPHILS NFR BLD AUTO: 38.3 %
NONHDLC SERPL-MCNC: 130 MG/DL
PLATELET # BLD AUTO: 236 K/UL
POTASSIUM SERPL-SCNC: 4.1 MMOL/L
PROT SERPL-MCNC: 6.7 G/DL
RBC # BLD: 4.57 M/UL
RBC # FLD: 13.2 %
SODIUM SERPL-SCNC: 143 MMOL/L
TRIGL SERPL-MCNC: 44 MG/DL
TSH SERPL-ACNC: 0.98 UIU/ML
WBC # FLD AUTO: 2.95 K/UL

## 2021-02-12 ENCOUNTER — RX RENEWAL (OUTPATIENT)
Age: 61
End: 2021-02-12

## 2021-02-16 ENCOUNTER — APPOINTMENT (OUTPATIENT)
Dept: FAMILY MEDICINE | Facility: CLINIC | Age: 61
End: 2021-02-16
Payer: COMMERCIAL

## 2021-02-16 VITALS
HEART RATE: 68 BPM | WEIGHT: 198 LBS | SYSTOLIC BLOOD PRESSURE: 120 MMHG | HEIGHT: 65 IN | DIASTOLIC BLOOD PRESSURE: 80 MMHG | BODY MASS INDEX: 32.99 KG/M2

## 2021-02-16 PROCEDURE — 99214 OFFICE O/P EST MOD 30 MIN: CPT

## 2021-02-16 PROCEDURE — 99072 ADDL SUPL MATRL&STAF TM PHE: CPT

## 2021-02-16 NOTE — HISTORY OF PRESENT ILLNESS
[FreeTextEntry1] : ANXIOUS [de-identified] : MS. MATHUR IS A PLEASANT 59 YO PRESENTING FOR ACUTE VISIT.  IS CURRENTLY UNDER [facility] CARE OF ONCOLOGY FOR LEFT SIDED BREAST CANCER AND IS ALSO BEING TREATED FOR CHEMOTHERAPY INDUCED PERIPHERAL NEUROPATHY.  DUE TO A LACK OF SUPPORT DURING HER DIAGNOSIS AND TREATMENT, SHE HAS ELECTED TO FILE FOR DIVORCE.  LAST WEEKEND SHE HAD AN ALTERCATION WITH HER  WHERE SHE BECAME UPSET AND THE POLICE WERE CALLED.  HE DID NOT PRESS CHARGES AGAINST HER.  SHE IS NOW STAYING WITH A GIRLFRIEND WHO IS VERY SUPPORTIVE.  SHE ALSO HAS AN APPOINTMENT WITH HER THERAPIST TOMORROW.  HAS BEEN FEELING ANXIOUS.  NOT DOWN OR DEPRESSED.  INFREQUENT XANAX USE FROM HER ONCOLOGIST.  WOULD LIKE A RENEWAL TODAY.  NO SUICIDAL/HOMICIDAL IDEATIONS OR PLANS.  GREAT SUPPORT FROM CHILDREN AND FAMILY AS WELL.  IS OTHERWISE FEELING WELL.

## 2021-02-16 NOTE — PHYSICAL EXAM
[No Acute Distress] : no acute distress [Well-Appearing] : well-appearing [Normal Sclera/Conjunctiva] : normal sclera/conjunctiva [PERRL] : pupils equal round and reactive to light [No Lymphadenopathy] : no lymphadenopathy [Supple] : supple [No Respiratory Distress] : no respiratory distress  [No Accessory Muscle Use] : no accessory muscle use [Clear to Auscultation] : lungs were clear to auscultation bilaterally [Normal Rate] : normal rate  [Regular Rhythm] : with a regular rhythm [Normal S1, S2] : normal S1 and S2 [No Joint Swelling] : no joint swelling [Grossly Normal Strength/Tone] : grossly normal strength/tone [Speech Grossly Normal] : speech grossly normal [Normal Affect] : the affect was normal [Normal Mood] : the mood was normal

## 2021-02-16 NOTE — PLAN
[FreeTextEntry1] : ONCOLOGY CONSULTANT NOTE APPRECIATED\par STRESS REDUCTION DISCUSSED\par CONTINUE THERAPY SESSIONS\par RX XANAX - TO USE LOWEST EFFECTIVE DOSAGE\par AWARE TO NOT DRIVE, DRINK ETOH, ETC. WITH USE\par WILL CONSIDER DAILY MEDICATION WITH ANY WORSENING/PERSISTENCE\par CALL WITH ANY QUESTIONS, CONCERNS OR CHANGES \par \par 34 MINUTES OF TIME SPENT IN OBTAINING HISTORY AND PHYSICAL, DISCUSSING TREATMENT PLAN.  REVIEWED ONCOLOGY CONSULTANT NOTES AND LAB WORK PERFORMED

## 2021-03-18 ENCOUNTER — APPOINTMENT (OUTPATIENT)
Dept: FAMILY MEDICINE | Facility: CLINIC | Age: 61
End: 2021-03-18
Payer: COMMERCIAL

## 2021-03-18 VITALS
WEIGHT: 202 LBS | DIASTOLIC BLOOD PRESSURE: 70 MMHG | BODY MASS INDEX: 33.66 KG/M2 | HEIGHT: 65 IN | HEART RATE: 70 BPM | SYSTOLIC BLOOD PRESSURE: 110 MMHG

## 2021-03-18 PROCEDURE — 99213 OFFICE O/P EST LOW 20 MIN: CPT

## 2021-03-18 PROCEDURE — 99072 ADDL SUPL MATRL&STAF TM PHE: CPT

## 2021-03-21 NOTE — HISTORY OF PRESENT ILLNESS
[FreeTextEntry1] : F/U ANXIETY [de-identified] : MS. MATHUR IS A PLEASANT 59 YO PRESENTING FOR FOLLOW-UP.  HISTORY OF ANXIETY.  ALSO HISTORY OF LEFT SIDED BREAST CANCER, PERIPHERAL NEUROPATHY AND OBESITY.  SEEING A COUNSELOR WHICH HELPS WITH ANXIETY.  IS NOT SLEEPING AS WELL.  FEELING ANXIOUS AT TIMES AND SOMETIMES A LITTLE DOWN.  HAS HAD NO SUICIDAL/HOMICIDAL IDEATIONS OR PLANS.  HAS TAKEN A FEW OF THE XANAX TABLETS WHICH HELPED.  HAS A VERY GOOD SUPPORT SYSTEM BETWEEN FAMILY AND HER FRIENDS.  STILL LIVING WITH HER  AS THEY ARE UNDERGOING A DIVORCE.  UTILIZING A .

## 2021-03-21 NOTE — PHYSICAL EXAM
[No Acute Distress] : no acute distress [Well Nourished] : well nourished [Well-Appearing] : well-appearing [No Respiratory Distress] : no respiratory distress  [No Accessory Muscle Use] : no accessory muscle use [Clear to Auscultation] : lungs were clear to auscultation bilaterally [Normal Rate] : normal rate  [Regular Rhythm] : with a regular rhythm [Normal S1, S2] : normal S1 and S2 [Speech Grossly Normal] : speech grossly normal [Normal Affect] : the affect was normal [Normal Mood] : the mood was normal

## 2021-03-21 NOTE — PLAN
[FreeTextEntry1] : WILL START ZOL[employer]FT \par POTENTIAL SIDE EFFECTS REVIEWED\par IF SUICIDAL/HOMICIDAL IDEATIONS OR PLANS CALL 911 OR GO TO ER IMMEDIATELY \par FOLLOW-UP FOR RECHECK\par GOOD SUPPORT SYSTEM\par THERAPY SESSIONS\par CALL WITH ANY QUESTIONS, CONCERNS OR CHANGES

## 2021-04-08 ENCOUNTER — APPOINTMENT (OUTPATIENT)
Dept: FAMILY MEDICINE | Facility: CLINIC | Age: 61
End: 2021-04-08

## 2021-06-03 ENCOUNTER — APPOINTMENT (OUTPATIENT)
Dept: FAMILY MEDICINE | Facility: CLINIC | Age: 61
End: 2021-06-03

## 2021-06-09 ENCOUNTER — OUTPATIENT (OUTPATIENT)
Dept: OUTPATIENT SERVICES | Facility: HOSPITAL | Age: 61
LOS: 1 days | Discharge: HOME | End: 2021-06-09

## 2021-06-09 DIAGNOSIS — Z98.890 OTHER SPECIFIED POSTPROCEDURAL STATES: Chronic | ICD-10-CM

## 2021-06-09 DIAGNOSIS — C50.919 MALIGNANT NEOPLASM OF UNSPECIFIED SITE OF UNSPECIFIED FEMALE BREAST: ICD-10-CM

## 2021-06-10 ENCOUNTER — APPOINTMENT (OUTPATIENT)
Dept: HEMATOLOGY ONCOLOGY | Facility: CLINIC | Age: 61
End: 2021-06-10
Payer: COMMERCIAL

## 2021-06-10 VITALS
TEMPERATURE: 97.2 F | DIASTOLIC BLOOD PRESSURE: 84 MMHG | BODY MASS INDEX: 35.26 KG/M2 | HEIGHT: 65 IN | HEART RATE: 63 BPM | OXYGEN SATURATION: 98 % | RESPIRATION RATE: 16 BRPM | SYSTOLIC BLOOD PRESSURE: 125 MMHG | WEIGHT: 211.64 LBS

## 2021-06-10 PROCEDURE — 99072 ADDL SUPL MATRL&STAF TM PHE: CPT

## 2021-06-10 PROCEDURE — 99213 OFFICE O/P EST LOW 20 MIN: CPT

## 2021-07-25 NOTE — HISTORY OF PRESENT ILLNESS
[Disease: _____________________] : Disease: [unfilled] [T: ___] : T[unfilled] [N: ___] : N[unfilled] [M: ___] : M[unfilled] [AJCC Stage: ____] : AJCC Stage: [unfilled] [de-identified] : Left breast cancer diagnosed at the age of 58\par 12/02/17 Screening mammography negative\par 12/14/18 Screening mammography showed a possible focal asymmetry in the left UOQ\par 01/03/19 Left breast diagnostic ultrasound showed an irregular hypoechoic mass at 1 o'clock axis measuring 0.6 x 1.5 x 0.8 cm, likely corresponding to a mammographic architectural distortion. Sonoguided core biopsy recommended.\par 01/04/19 Left breast 1 o'clock axis 4 cm FN sonoguided core biopsy showed infiltrating lobular carcinoma, SBR 5/9, ER/[place]  %, Ki-67 10% and HER-2 negative.\par 01/14/19 MRI of the breast showed a 1.9 x 1.4 x 0.7 cm irregular mass with an irregular margin in the left breast at 2 o'clock axis, consistent with known carcinoma\par 01/24/19 Left breast 1 o'clock axis UOQ lumpectomy showed a 2 cm invasive carcinoma with ductal and lobular features, SBR 6/9 with 0/2 LN involved, ER 70%, [place] very focal weak positivity, KI-67 14% and HER-2 negative. Left inferior margin revealed multiple foci of invasive carcinoma with lobular features, largest one measuring 4 mm, located at 1.5 mm from the final inferior margin, multiple foci of LCIS as well. Left anterior medial margin showed a 1.2 mm focus of invasive carcinoma with lobular features located at 3 mm from the margin and a 1.5 mm focus about 0.5 mm from the anterior medial margin\par 3/1/19 Oncotype showed a recurrence score of 31, which is correlated with a 19% risk of distant recurrence at 9 years and an absolute chemotherapy benefit of >15%\par 3/21/19 Taxotere and Cytoxan with Onpro every 3 weeks x 4\par 5/23/19 [facility] 47 gene panel negative for pathogenic variants\par 06/26/19 - 7/18/19 Adjuvant Radiation with Dr. Lorenzana\par 9/2019 Start anastrozole [de-identified] :  2 cm Invasive carcinoma with ductal and lobular features, SBR 6/9, 0/2 SLN, ER 70%, [place] weakly +, HER-2 negative, KI-67 14%. Multifocal given the presence of foci of invasive carcinoma in additional margins, Oncotype = 31 (RR 19%) [de-identified] : Courtney started anastrozole in 9/19 and continues to do fairly well on it with occasional hot flashes and stable joint pain.\par The joint pain and stiffness is significantly better so she continues to take Tumeric daily and Naproxen only as needed. She occasionally gets hip pain.\par She continues to take Gabapentin 600 mg at bedtime which helps with the hot flashes. She occasionally gets some numbness in her toes, but no pain in her fingers or toes.\par She is currently working from home due to COVID, which she is happy about as she is able to focus on her health and wellbeing.\par She is filing for divorce. The breast cancer diagnosis brought out the lack of support in her marriage\par She got the Moderna COVID vaccine in 3/21 and 4/21.\par \par HEALTH MAINTENANCE:\par Mammogram: 02/20 \par Pap smear: 1/21 negative.\par Bone density DEXA scan: 09/18/19 showed T scores of -0.6 in the spine, -0.4 in femoral neck and 0.1 hip\par Colonoscopy: 6/12/18 negative\par Genetic testing: Sister had breast cancer at age 26. [facility] genetic panel of 47 genes sent 5/23/19 and negative for any pathogenic variants \par

## 2021-09-17 ENCOUNTER — OUTPATIENT (OUTPATIENT)
Dept: OUTPATIENT SERVICES | Facility: HOSPITAL | Age: 61
LOS: 1 days | End: 2021-09-17
Payer: COMMERCIAL

## 2021-09-17 ENCOUNTER — RESULT REVIEW (OUTPATIENT)
Age: 61
End: 2021-09-17

## 2021-09-17 ENCOUNTER — APPOINTMENT (OUTPATIENT)
Dept: RADIOLOGY | Facility: CLINIC | Age: 61
End: 2021-09-17
Payer: COMMERCIAL

## 2021-09-17 DIAGNOSIS — Z98.890 OTHER SPECIFIED POSTPROCEDURAL STATES: Chronic | ICD-10-CM

## 2021-09-17 DIAGNOSIS — Z00.8 ENCOUNTER FOR OTHER GENERAL EXAMINATION: ICD-10-CM

## 2021-09-17 PROCEDURE — 77081 DXA BONE DENSITY APPENDICULR: CPT | Mod: 26

## 2021-09-17 PROCEDURE — 77081 DXA BONE DENSITY APPENDICULR: CPT

## 2021-12-14 ENCOUNTER — OUTPATIENT (OUTPATIENT)
Dept: OUTPATIENT SERVICES | Facility: HOSPITAL | Age: 61
LOS: 1 days | Discharge: HOME | End: 2021-12-14

## 2021-12-14 DIAGNOSIS — Z98.890 OTHER SPECIFIED POSTPROCEDURAL STATES: Chronic | ICD-10-CM

## 2021-12-14 DIAGNOSIS — C50.919 MALIGNANT NEOPLASM OF UNSPECIFIED SITE OF UNSPECIFIED FEMALE BREAST: ICD-10-CM

## 2021-12-16 ENCOUNTER — RESULT REVIEW (OUTPATIENT)
Age: 61
End: 2021-12-16

## 2021-12-16 ENCOUNTER — APPOINTMENT (OUTPATIENT)
Dept: HEMATOLOGY ONCOLOGY | Facility: CLINIC | Age: 61
End: 2021-12-16
Payer: COMMERCIAL

## 2021-12-16 VITALS
RESPIRATION RATE: 16 BRPM | SYSTOLIC BLOOD PRESSURE: 138 MMHG | WEIGHT: 206.13 LBS | HEART RATE: 62 BPM | OXYGEN SATURATION: 100 % | TEMPERATURE: 97.7 F | BODY MASS INDEX: 34.3 KG/M2 | DIASTOLIC BLOOD PRESSURE: 86 MMHG

## 2021-12-16 LAB
BASOPHILS # BLD AUTO: 0.04 K/UL — SIGNIFICANT CHANGE UP (ref 0–0.2)
BASOPHILS NFR BLD AUTO: 0.9 % — SIGNIFICANT CHANGE UP (ref 0–2)
EOSINOPHIL # BLD AUTO: 0.04 K/UL — SIGNIFICANT CHANGE UP (ref 0–0.5)
EOSINOPHIL NFR BLD AUTO: 0.9 % — SIGNIFICANT CHANGE UP (ref 0–6)
HCT VFR BLD CALC: 40.4 % — SIGNIFICANT CHANGE UP (ref 34.5–45)
HGB BLD-MCNC: 13 G/DL — SIGNIFICANT CHANGE UP (ref 11.5–15.5)
IMM GRANULOCYTES NFR BLD AUTO: 0.2 % — SIGNIFICANT CHANGE UP (ref 0–1.5)
LYMPHOCYTES # BLD AUTO: 1.38 K/UL — SIGNIFICANT CHANGE UP (ref 1–3.3)
LYMPHOCYTES # BLD AUTO: 32.7 % — SIGNIFICANT CHANGE UP (ref 13–44)
MCHC RBC-ENTMCNC: 29.1 PG — SIGNIFICANT CHANGE UP (ref 27–34)
MCHC RBC-ENTMCNC: 32.2 G/DL — SIGNIFICANT CHANGE UP (ref 32–36)
MCV RBC AUTO: 90.6 FL — SIGNIFICANT CHANGE UP (ref 80–100)
MONOCYTES # BLD AUTO: 0.27 K/UL — SIGNIFICANT CHANGE UP (ref 0–0.9)
MONOCYTES NFR BLD AUTO: 6.4 % — SIGNIFICANT CHANGE UP (ref 2–14)
NEUTROPHILS # BLD AUTO: 2.48 K/UL — SIGNIFICANT CHANGE UP (ref 1.8–7.4)
NEUTROPHILS NFR BLD AUTO: 58.9 % — SIGNIFICANT CHANGE UP (ref 43–77)
NRBC # BLD: 0 /100 WBCS — SIGNIFICANT CHANGE UP (ref 0–0)
PLATELET # BLD AUTO: 241 K/UL — SIGNIFICANT CHANGE UP (ref 150–400)
RBC # BLD: 4.46 M/UL — SIGNIFICANT CHANGE UP (ref 3.8–5.2)
RBC # FLD: 13.8 % — SIGNIFICANT CHANGE UP (ref 10.3–14.5)
WBC # BLD: 4.22 K/UL — SIGNIFICANT CHANGE UP (ref 3.8–10.5)
WBC # FLD AUTO: 4.22 K/UL — SIGNIFICANT CHANGE UP (ref 3.8–10.5)

## 2021-12-16 PROCEDURE — 99214 OFFICE O/P EST MOD 30 MIN: CPT

## 2021-12-17 NOTE — HISTORY OF PRESENT ILLNESS
[Disease: _____________________] : Disease: [unfilled] [T: ___] : T[unfilled] [N: ___] : N[unfilled] [M: ___] : M[unfilled] [AJCC Stage: ____] : AJCC Stage: [unfilled] [de-identified] : Left breast cancer diagnosed at the age of 58\par 12/02/17 Screening mammography negative\par 12/14/18 Screening mammography showed a possible focal asymmetry in the left UOQ\par 01/03/19 Left breast diagnostic ultrasound showed an irregular hypoechoic mass at 1 o'clock axis measuring 0.6 x 1.5 x 0.8 cm, likely corresponding to a mammographic architectural distortion. Sonoguided core biopsy recommended.\par 01/04/19 Left breast 1 o'clock axis 4 cm FN sonoguided core biopsy showed infiltrating lobular carcinoma, SBR 5/9, ER/[place]  %, Ki-67 10% and HER-2 negative.\par 01/14/19 MRI of the breast showed a 1.9 x 1.4 x 0.7 cm irregular mass with an irregular margin in the left breast at 2 o'clock axis, consistent with known carcinoma\par 01/24/19 Left breast 1 o'clock axis UOQ lumpectomy showed a 2 cm invasive carcinoma with ductal and lobular features, SBR 6/9 with 0/2 LN involved, ER 70%, [place] very focal weak positivity, KI-67 14% and HER-2 negative. Left inferior margin revealed multiple foci of invasive carcinoma with lobular features, largest one measuring 4 mm, located at 1.5 mm from the final inferior margin, multiple foci of LCIS as well. Left anterior medial margin showed a 1.2 mm focus of invasive carcinoma with lobular features located at 3 mm from the margin and a 1.5 mm focus about 0.5 mm from the anterior medial margin\par 3/1/19 Oncotype showed a recurrence score of 31, which is correlated with a 19% risk of distant recurrence at 9 years and an absolute chemotherapy benefit of >15%\par 3/21/19 Taxotere and Cytoxan with Onpro every 3 weeks x 4\par 5/23/19 [facility] 47 gene panel negative for pathogenic variants\par 06/26/19 - 7/18/19 Adjuvant Radiation with Dr. Lorenzana\par 9/2019 Start anastrozole [de-identified] :  2 cm Invasive carcinoma with ductal and lobular features, SBR 6/9, 0/2 SLN, ER 70%, SD weakly +, HER-2 negative, KI-67 14%. Multifocal given the presence of foci of invasive carcinoma in additional margins, Oncotype = 31 (RR 19%) [de-identified] : Courtney started anastrozole in 9/19 and continues to do fairly well on it with occasional hot flashes and stable joint pain.\par The joint pain and stiffness is better on Turmeric daily and Naproxen only as needed. Recently she has had increased left shoulder pain.\par She continues to take Gabapentin 600 mg at bedtime which helps with the hot flashes. She occasionally gets some numbness in her toes, but no pain in her fingers or toes.\par She is currently working from home due to COVID.\par She is filing for divorce. The breast cancer diagnosis brought out the lack of support in her marriage\par She got the Moderna COVID vaccine in 3/21 and 4/21.\par \par HEALTH MAINTENANCE:\par Mammogram: 02/20 \par Pap smear: 1/21 negative.\par Bone density DEXA scan: 09/17/21 showed T scores of  0.3 in the spine, 0.2 in femoral neck and 0.7 in total hip and 0.9 in forearm\par                                           09/18/19 showed T scores of -0.6 in the spine, -0.4 in femoral neck and 0.1 hip\par Colonoscopy: 6/12/18 negative\par Genetic testing: Sister had breast cancer at age 26. [facility] genetic panel of 47 genes sent 5/23/19 and negative for any pathogenic variants \par

## 2021-12-21 LAB
25(OH)D3 SERPL-MCNC: 47.3 NG/ML
ALBUMIN SERPL ELPH-MCNC: 4.6 G/DL
ALP BLD-CCNC: 95 U/L
ALT SERPL-CCNC: 16 U/L
ANION GAP SERPL CALC-SCNC: 12 MMOL/L
AST SERPL-CCNC: 18 U/L
BILIRUB SERPL-MCNC: 0.4 MG/DL
BUN SERPL-MCNC: 9 MG/DL
CALCIUM SERPL-MCNC: 9.7 MG/DL
CHLORIDE SERPL-SCNC: 106 MMOL/L
CHOLEST SERPL-MCNC: 237 MG/DL
CO2 SERPL-SCNC: 24 MMOL/L
CREAT SERPL-MCNC: 0.86 MG/DL
ESTIMATED AVERAGE GLUCOSE: 111 MG/DL
GLUCOSE SERPL-MCNC: 94 MG/DL
HBA1C MFR BLD HPLC: 5.5 %
HDLC SERPL-MCNC: 117 MG/DL
LDLC SERPL CALC-MCNC: 111 MG/DL
NONHDLC SERPL-MCNC: 120 MG/DL
POTASSIUM SERPL-SCNC: 4 MMOL/L
PROT SERPL-MCNC: 7.1 G/DL
SODIUM SERPL-SCNC: 143 MMOL/L
TRIGL SERPL-MCNC: 47 MG/DL
TSH SERPL-ACNC: 0.74 UIU/ML

## 2022-02-16 ENCOUNTER — LABORATORY RESULT (OUTPATIENT)
Age: 62
End: 2022-02-16

## 2022-02-16 ENCOUNTER — NON-APPOINTMENT (OUTPATIENT)
Age: 62
End: 2022-02-16

## 2022-02-16 ENCOUNTER — APPOINTMENT (OUTPATIENT)
Dept: FAMILY MEDICINE | Facility: CLINIC | Age: 62
End: 2022-02-16
Payer: COMMERCIAL

## 2022-02-16 VITALS
HEART RATE: 80 BPM | WEIGHT: 207 LBS | DIASTOLIC BLOOD PRESSURE: 80 MMHG | BODY MASS INDEX: 34.49 KG/M2 | HEIGHT: 65 IN | SYSTOLIC BLOOD PRESSURE: 140 MMHG

## 2022-02-16 VITALS — SYSTOLIC BLOOD PRESSURE: 114 MMHG | DIASTOLIC BLOOD PRESSURE: 82 MMHG

## 2022-02-16 PROCEDURE — 99396 PREV VISIT EST AGE 40-64: CPT | Mod: 25

## 2022-02-16 PROCEDURE — G0444 DEPRESSION SCREEN ANNUAL: CPT | Mod: NC,59

## 2022-02-16 PROCEDURE — 93000 ELECTROCARDIOGRAM COMPLETE: CPT | Mod: 59

## 2022-02-16 RX ORDER — SERTRALINE 25 MG/1
25 TABLET, FILM COATED ORAL
Qty: 30 | Refills: 0 | Status: DISCONTINUED | COMMUNITY
Start: 2021-03-18 | End: 2022-02-16

## 2022-02-16 NOTE — PLAN
[FreeTextEntry1] : VISION SCREENING\par SAFETY / HEALTHY HABITS REVIEWED\par HEALTHY DIET AND LIFESTYLE MODIFICATIONS\par HEALTHY WEIGHT LOSS \par RECENT LAB WORK REVIEWED\par EKG: SINUS AT 58 BPM, NO ACUTE ST-T WAVE CHANGES; SIMILAR TO PRIOR\par FOLLOW-UP ALL SPECIALISTS AS DIRECTED \par VACCINATIONS DISCUSSED: FLU, TDAP, SHINGRIX\par HEME/ONC CONSULTANT NOTE REVIEWED\par CALL WITH ANY QUESTIONS, CONCERNS OR CHANGES

## 2022-02-16 NOTE — PHYSICAL EXAM
[No Acute Distress] : no acute distress [Well-Appearing] : well-appearing [Well Nourished] : well nourished [Normal Sclera/Conjunctiva] : normal sclera/conjunctiva [PERRL] : pupils equal round and reactive to light [Normal Outer Ear/Nose] : the outer ears and nose were normal in appearance [Normal Oropharynx] : the oropharynx was normal [Normal TMs] : both tympanic membranes were normal [No Lymphadenopathy] : no lymphadenopathy [Supple] : supple [No Respiratory Distress] : no respiratory distress  [No Accessory Muscle Use] : no accessory muscle use [Clear to Auscultation] : lungs were clear to auscultation bilaterally [Normal Rate] : normal rate  [Regular Rhythm] : with a regular rhythm [Normal S1, S2] : normal S1 and S2 [Soft] : abdomen soft [Non Tender] : non-tender [Normal Bowel Sounds] : normal bowel sounds [No Joint Swelling] : no joint swelling [Grossly Normal Strength/Tone] : grossly normal strength/tone [No Rash] : no rash [Coordination Grossly Intact] : coordination grossly intact [No Focal Deficits] : no focal deficits [Normal Gait] : normal gait [Deep Tendon Reflexes (DTR)] : deep tendon reflexes were 2+ and symmetric [Speech Grossly Normal] : speech grossly normal [Memory Grossly Normal] : memory grossly normal [Normal Mood] : the mood was normal

## 2022-02-16 NOTE — HEALTH RISK ASSESSMENT
[Former] : Former [0-4] : 0-4 [Yes] : Yes [1 or 2 (0 pts)] : 1 or 2 (0 points) [4 or more  times a week (4 pts)] : 4 or more  times a week (4 points) [Never (0 pts)] : Never (0 points) [No] : In the past 12 months have you used drugs other than those required for medical reasons? No [Very Good] : ~his/her~  mood as very good [0] : 2) Feeling down, depressed, or hopeless: Not at all (0) [PHQ-2 Negative - No further assessment needed] : PHQ-2 Negative - No further assessment needed [HIV test declined] : HIV test declined [Hepatitis C test declined] : Hepatitis C test declined [None] : None [Alone] : lives alone [Employed] : employed [High School] : high school [# Of Children ___] : has [unfilled] children [Feels Safe at Home] : Feels safe at home [Reports normal functional visual acuity (ie: able to read med bottle)] : Reports normal functional visual acuity [Smoke Detector] : smoke detector [Carbon Monoxide Detector] : carbon monoxide detector [Safety elements used in home] : safety elements used in home [Seat Belt] :  uses seat belt [Sunscreen] : uses sunscreen [With Patient/Caregiver] : , with patient/caregiver [YearQuit] : 1996 [Audit-CScore] : 4 [de-identified] : some walking, plans to do more in nicer weather.   [de-identified] : "okay" "not bad" some room to improve [[id number]] : 0 [Language] : denies difficulty with language [Learning/Retaining New Information] : denies difficulty learning/retaining new information [Handling Complex Tasks] : denies difficulty handling complex tasks [Sexually Active] : not sexually active [High Risk Behavior] : no high risk behavior [Reports changes in hearing] : Reports no changes in hearing [Reports changes in vision] : Reports no changes in vision [Reports changes in dental health] : Reports no changes in dental health [MammogramDate] : 2021 [MammogramComments] : appointment friday - briseyda [PapSmearDate] : 02/22 [BoneDensityDate] : 09/21 [ColonoscopyDate] : 06/18 [de-identified] : some college [FreeTextEntry3] :  [de-identified] : glasses for reading [AdvancecareDate] : 02/22

## 2022-02-16 NOTE — HISTORY OF PRESENT ILLNESS
[FreeTextEntry1] : physical [de-identified] : MS. MATHUR IS A PLEASANT 60 YO PRESENTING FOR YEARLY PHYSICAL.  HISTORY OF LEFT BREAST CANCER MONITORED ROUTINELY BY HEME/ONC.  IS ON ANASTRAZOLE.  FEELING WELL TODAY.

## 2022-02-17 LAB
APPEARANCE: CLEAR
BILIRUBIN URINE: NEGATIVE
BLOOD URINE: NEGATIVE
COLOR: YELLOW
GLUCOSE QUALITATIVE U: NEGATIVE
KETONES URINE: NEGATIVE
LEUKOCYTE ESTERASE URINE: ABNORMAL
NITRITE URINE: NEGATIVE
PH URINE: 6.5
PROTEIN URINE: NORMAL
SPECIFIC GRAVITY URINE: 1.02
UROBILINOGEN URINE: ABNORMAL

## 2022-04-14 ENCOUNTER — APPOINTMENT (OUTPATIENT)
Dept: FAMILY MEDICINE | Facility: CLINIC | Age: 62
End: 2022-04-14
Payer: COMMERCIAL

## 2022-04-14 ENCOUNTER — NON-APPOINTMENT (OUTPATIENT)
Age: 62
End: 2022-04-14

## 2022-04-14 VITALS
OXYGEN SATURATION: 98 % | HEIGHT: 65 IN | BODY MASS INDEX: 34.16 KG/M2 | WEIGHT: 205 LBS | SYSTOLIC BLOOD PRESSURE: 118 MMHG | DIASTOLIC BLOOD PRESSURE: 74 MMHG | HEART RATE: 88 BPM

## 2022-04-14 DIAGNOSIS — R45.0 NERVOUSNESS: ICD-10-CM

## 2022-04-14 PROCEDURE — 93000 ELECTROCARDIOGRAM COMPLETE: CPT

## 2022-04-14 PROCEDURE — 99213 OFFICE O/P EST LOW 20 MIN: CPT | Mod: 25

## 2022-04-14 PROCEDURE — 36415 COLL VENOUS BLD VENIPUNCTURE: CPT

## 2022-04-15 PROBLEM — R45.0 JITTERY FEELING: Status: ACTIVE | Noted: 2022-04-14

## 2022-04-15 LAB
ALBUMIN SERPL ELPH-MCNC: 4.6 G/DL
ALP BLD-CCNC: 91 U/L
ALT SERPL-CCNC: 19 U/L
ANION GAP SERPL CALC-SCNC: 14 MMOL/L
AST SERPL-CCNC: 23 U/L
BASOPHILS # BLD AUTO: 0.04 K/UL
BASOPHILS NFR BLD AUTO: 0.8 %
BILIRUB SERPL-MCNC: 0.2 MG/DL
BUN SERPL-MCNC: 10 MG/DL
CALCIUM SERPL-MCNC: 9.3 MG/DL
CHLORIDE SERPL-SCNC: 108 MMOL/L
CO2 SERPL-SCNC: 22 MMOL/L
CREAT SERPL-MCNC: 0.75 MG/DL
EGFR: 92 ML/MIN/1.73M2
EOSINOPHIL # BLD AUTO: 0.11 K/UL
EOSINOPHIL NFR BLD AUTO: 2.3 %
GLUCOSE SERPL-MCNC: 99 MG/DL
HCT VFR BLD CALC: 41.3 %
HGB BLD-MCNC: 13.1 G/DL
IMM GRANULOCYTES NFR BLD AUTO: 0.2 %
LYMPHOCYTES # BLD AUTO: 1.75 K/UL
LYMPHOCYTES NFR BLD AUTO: 36.8 %
MAN DIFF?: NORMAL
MCHC RBC-ENTMCNC: 28.4 PG
MCHC RBC-ENTMCNC: 31.7 GM/DL
MCV RBC AUTO: 89.4 FL
MONOCYTES # BLD AUTO: 0.37 K/UL
MONOCYTES NFR BLD AUTO: 7.8 %
NEUTROPHILS # BLD AUTO: 2.48 K/UL
NEUTROPHILS NFR BLD AUTO: 52.1 %
PLATELET # BLD AUTO: 272 K/UL
POTASSIUM SERPL-SCNC: 3.9 MMOL/L
PROT SERPL-MCNC: 7 G/DL
RBC # BLD: 4.62 M/UL
RBC # FLD: 13.7 %
SODIUM SERPL-SCNC: 143 MMOL/L
T4 FREE SERPL-MCNC: 1.3 NG/DL
TSH SERPL-ACNC: 0.64 UIU/ML
WBC # FLD AUTO: 4.76 K/UL

## 2022-04-15 NOTE — HISTORY OF PRESENT ILLNESS
[FreeTextEntry1] : "JITTERS" [de-identified] : MS. MATHUR IS A PLEASANT 62 YO PRESENTING FOR FOLLOW-UP.  STARTED WALKING FOUR MILES IN THE MORNING.  AT THE SECOND MILE FEELS "JITTERY" BUT GETS BETTER AS SHE KEEPS GOING.  GETS HOME TAKES A SHOWER AND THEN HANDS ARE SHAKY AGAIN.  EATS A YOGURT AND FEELS BETTER.  ONLY HAS A JITTERY FEELING AT THAT TIME.  OTHERWISE FEELS GREAT.  NO CHEST PAIN, PALPITATIONS, SHORTNESS OF BREATH, HEADACHE, DIZZINESS, SWELLING, SYNCOPE OR ANY OTHER COMPLAINTS.  LAST SAW CARDIOLOGY IN 2013.

## 2022-04-15 NOTE — PLAN
[FreeTextEntry1] : REMAIN HYDRATED\par TO EAT PRIOR TO EXERCISE AND SEE IF IMPROVEMENT\par WILL CHECK LAB WORK\par EKG: NSR AT 62 BPM, CHRONIC INVERTED T IN III\par CARDIOLOGY FOLLOW-UP\par CALL WITH ANY QUESTIONS, CONCERNS OR CHANGES OR IF SYMPTOMS PERSIST/WORSEN

## 2022-04-15 NOTE — PHYSICAL EXAM
[No Acute Distress] : no acute distress [Well Nourished] : well nourished [Well-Appearing] : well-appearing [Normal Sclera/Conjunctiva] : normal sclera/conjunctiva [PERRL] : pupils equal round and reactive to light [No Respiratory Distress] : no respiratory distress  [No Accessory Muscle Use] : no accessory muscle use [Clear to Auscultation] : lungs were clear to auscultation bilaterally [Normal Rate] : normal rate  [Regular Rhythm] : with a regular rhythm [Normal S1, S2] : normal S1 and S2 [No Joint Swelling] : no joint swelling [Grossly Normal Strength/Tone] : grossly normal strength/tone [Coordination Grossly Intact] : coordination grossly intact [No Focal Deficits] : no focal deficits [Normal Gait] : normal gait [Deep Tendon Reflexes (DTR)] : deep tendon reflexes were 2+ and symmetric

## 2022-04-21 LAB
ESTIMATED AVERAGE GLUCOSE: 117 MG/DL
HBA1C MFR BLD HPLC: 5.7 %

## 2022-06-15 ENCOUNTER — APPOINTMENT (OUTPATIENT)
Dept: ORTHOPEDIC SURGERY | Facility: CLINIC | Age: 62
End: 2022-06-15

## 2022-06-15 VITALS — BODY MASS INDEX: 34.16 KG/M2 | HEIGHT: 65 IN | WEIGHT: 205 LBS

## 2022-06-15 DIAGNOSIS — M75.41 IMPINGEMENT SYNDROME OF RIGHT SHOULDER: ICD-10-CM

## 2022-06-15 PROCEDURE — 20610 DRAIN/INJ JOINT/BURSA W/O US: CPT

## 2022-06-15 PROCEDURE — J3490M: CUSTOM

## 2022-06-15 PROCEDURE — 99214 OFFICE O/P EST MOD 30 MIN: CPT | Mod: 25

## 2022-06-15 NOTE — DISCUSSION/SUMMARY
[de-identified] : Two LEFT SHOULDER Corticosteroid Injections administered - Proximal Biceps AND Subacromial. \par \par Cortisone shoulder injection -  LEFT\par Cortisone shot into the Subacromial space AND Proximal Biceps Sheath is recommended because of the severity of symptoms.  The risks, benefits, and alternatives to cortisone injection were explained in full to the patient.  Risks outlined include but are not limited to infection, sepsis, bleeding, scarring, skin discoloration, temporary increase in pain, syncopal episode, failure to resolve symptoms, allergic reaction, symptom recurrence, and elevation of blood sugar in diabetics.  Patient understood the risks.  All questions were answered.  After discussion of options, patient requested an injection.  Oral informed consent was obtained and sterile prep was done of the injection site.  A mixture of 40 mg of Kenalog, 1 cc of 1% Lidocaine was sterilely prepared by me.  Sterile technique was used to introduce the mixture into the subacromial bursa via the anterior lateral portal.  Patient tolerated the procedure well.  Advised to ice the injection site this evening. Ultrasound used for proper needle placement. \par \par Referred patient to physical therapy. \par Patient will follow up in 6 weeks \par -----------------------------------------------\par Home Exercise\par The patient is instructed on a home exercise program.\par \par CHARLES ROLAND Acting as a Scribe for Dr. Healy\par I, Charles Roland, attest that this documentation has been prepared under the direction and in the presence of Provider Rikki Healy MD.\par \par Activity Modification\par The patient was advised to modify their activities.\par \par Dx / Natural History\par The patient was advised of the diagnosis.  The natural history of the pathology was explained in full to the patient in layman's terms.  Several different treatment options were discussed and explained in full to the patient including the risks and benefits of both surgical and non-surgical treatments.  All questions and concerns were answered.\par \par Pain Guide Activities\par The patient was advised to let pain guide the gradual advancement of activities.\par \par RICE\par I explained to the patient that rest, ice, compression, and elevation would benefit them.  They may return to activity after follow-up or when they no longer have any pain.

## 2022-06-15 NOTE — PHYSICAL EXAM
[de-identified] : Neurologic: normal coordination, normal DTR UE/LE , normal sensation, normal mood and affect, orientated and able to communicate. \par Skin: normal skin, no rash, no ulcers and no lesions. \par Lymphatic: no obvious lymphadenopathy in areas examined. \par Constitutional: well developed and well nourished. \par Cardiovascular: peripheral vascular exam is grossly normal. \par Pulmonary: no respiratory distress, lungs clear to auscultation bilaterally. \par Abdomen: normal bowel sounds, non-tender, no HSM and no mass. \par \par Left Shoulder:\par Positive Neer test\par Positive Ross test \par Positive Impingement test\par Supraspinatus strength: 4-/5\par +Speed's\par +Yergason's\par Proximal biceps tenderness\par \par X-ray 2+ views L shoulder: Unremarkable

## 2022-06-15 NOTE — HISTORY OF PRESENT ILLNESS
[de-identified] : The patient is a 61 year old right hand dominant female who presents today complaining of left shoulder.  \par Date of Injury/Onset: n/a\par Pain:    At Rest: 9/10 \par With Activity:  9/10 \par Mechanism of injury: no injury\par This is not a Work Related Injury being treated under Worker's Compensation.\par This is not an athletic injury occurring associated with an interscholastic or organized sports team.\par Quality of symptoms: throbbing, aching, sharp\par Improves with: ice\par Worse with: lifting, activity \par Prior treatment: Dr Cali\par Prior Imaging: yes\par Out of work/sport: _, since _\par School/Sport/Position/Occupation: working fulltime, \par Additional Information: None\par

## 2022-06-17 ENCOUNTER — OUTPATIENT (OUTPATIENT)
Dept: OUTPATIENT SERVICES | Facility: HOSPITAL | Age: 62
LOS: 1 days | Discharge: HOME | End: 2022-06-17

## 2022-06-17 DIAGNOSIS — C50.919 MALIGNANT NEOPLASM OF UNSPECIFIED SITE OF UNSPECIFIED FEMALE BREAST: ICD-10-CM

## 2022-06-17 DIAGNOSIS — Z98.890 OTHER SPECIFIED POSTPROCEDURAL STATES: Chronic | ICD-10-CM

## 2022-06-23 ENCOUNTER — APPOINTMENT (OUTPATIENT)
Dept: HEMATOLOGY ONCOLOGY | Facility: CLINIC | Age: 62
End: 2022-06-23

## 2022-07-18 ENCOUNTER — APPOINTMENT (OUTPATIENT)
Dept: ORTHOPEDIC SURGERY | Facility: CLINIC | Age: 62
End: 2022-07-18

## 2022-07-18 DIAGNOSIS — M54.12 RADICULOPATHY, CERVICAL REGION: ICD-10-CM

## 2022-07-18 PROCEDURE — 99214 OFFICE O/P EST MOD 30 MIN: CPT | Mod: 25

## 2022-07-18 PROCEDURE — 20553 NJX 1/MLT TRIGGER POINTS 3/>: CPT

## 2022-07-23 ENCOUNTER — APPOINTMENT (OUTPATIENT)
Dept: ORTHOPEDIC SURGERY | Facility: CLINIC | Age: 62
End: 2022-07-23

## 2022-07-23 NOTE — HISTORY OF PRESENT ILLNESS
[Neck] : neck [Upper back] : upper back [Mid-back] : mid-back [10] : 10 [Stabbing] : stabbing [Tightness] : tightness [Constant] : constant [Sitting] : sitting [Standing] : standing [Full time] : Work status: full time [de-identified] : 60 y/o female presents for a FOV of neck/shoulder pain. C/o increased pain radiating from RT side of neck into shoulder blade, starting approx 1 week ago. Denies pain radiating into the b/l arms. Patient received injection into bicep, which she found helpful. Patient has been taking Advil, with provides little relief.  [] : no

## 2022-07-23 NOTE — PHYSICAL EXAM
[] : full ROM with no pain  [Flexion] : flexion [Extension] : extension [Rotation to left] : rotation to left [Rotation to right] : rotation to right [de-identified] : Constitutional:\par - General Appearance:\par Unremarkable\par Body Habitus\par Well Developed\par Well Nourished\par Body Habitus\par No Deformities\par Well Groomed\par Ability To communicate:\par Normal\par Neurologic:\par Global sensation is intact to upper and lower extremities. See examination of Neck and/or Spine\par for exceptions.\par Orientation to Time, Place and Person is: Normal\par Mood And Affect is Normal\par Skin:\par - Head/Face, Right Upper/Lower Extremity, Left Upper/Lower Extremity: Normal\par See Examination of Neck and/or Spine for exceptions\par Cardiovascular:\par Peripheral Cardiovascular System is Normal\par Palpation of Lymph Nodes:\par Normal Palpation of lymph nodes in: Axilla, Cervical, Inguinal\par Abnormal Palpation of lymph nodes in: None

## 2022-07-23 NOTE — DISCUSSION/SUMMARY
[de-identified] : Today in office as part of ongoing treatment 2 trigger point injections is administered into cervical to facilitate ROM and stretching. Injections consisted of 1cc Kenalog 40 and 4cc .25% Marcaine. Prescribed methocarbamol. Titration schedule provided. I am prescribing the patient MDP for pain relief. Titration schedule provided. Patient was provided with a referral for cervical physical therapy to work on stretching, strengthening and range of motion. Patient was provided with a cervical home exercise program. \par \par Prior to appointment and during encounter with patient extensive medical records were reviewed including but not limited to, hospital records, outpatient records, imaging results, and lab data.During this appointment the patient was examined, diagnoses were discussed and explained in a face to face manner. In addition extensive time was spent reviewing aforementioned diagnostic studies. Counseling including abnormal image results, differential diagnoses, treatment options, risk and benefits, lifestyle changes, current condition, and current medications was performed. Patient's comments, questions, and concerns were addressed and patient verbalized understanding. Based on this patient's presentation at our office, which is an orthopedic spine surgeon's office, this patient inherently / intrinsically has a risk, however minute, of developing issues such as Cauda equina syndrome, bowel and bladder changes, or progression of motor or neurological deficits such as paralysis which may be permanent. \par \par SALMA HE Acting as a Scribe for Dr. Gloria WHITMORE, Salma He, attest that this documentation has been prepared under the direction and in the presence of Provider Sia Cali MD.

## 2022-07-27 ENCOUNTER — APPOINTMENT (OUTPATIENT)
Dept: ORTHOPEDIC SURGERY | Facility: CLINIC | Age: 62
End: 2022-07-27

## 2022-08-02 ENCOUNTER — APPOINTMENT (OUTPATIENT)
Age: 62
End: 2022-08-02

## 2022-08-02 VITALS
RESPIRATION RATE: 16 BRPM | BODY MASS INDEX: 33.39 KG/M2 | HEART RATE: 70 BPM | WEIGHT: 200.62 LBS | DIASTOLIC BLOOD PRESSURE: 81 MMHG | OXYGEN SATURATION: 96 % | SYSTOLIC BLOOD PRESSURE: 121 MMHG | TEMPERATURE: 97.5 F

## 2022-08-02 PROCEDURE — 99213 OFFICE O/P EST LOW 20 MIN: CPT

## 2022-08-02 RX ORDER — METHYLPREDNISOLONE 4 MG/1
4 TABLET ORAL
Qty: 1 | Refills: 0 | Status: DISCONTINUED | COMMUNITY
Start: 2022-07-18 | End: 2022-08-02

## 2022-08-02 RX ORDER — ALPRAZOLAM 0.25 MG/1
0.25 TABLET ORAL
Qty: 30 | Refills: 0 | Status: DISCONTINUED | COMMUNITY
Start: 2019-04-02 | End: 2022-08-02

## 2022-08-02 NOTE — HISTORY OF PRESENT ILLNESS
[Disease: _____________________] : Disease: [unfilled] [T: ___] : T[unfilled] [N: ___] : N[unfilled] [M: ___] : M[unfilled] [AJCC Stage: ____] : AJCC Stage: [unfilled] [de-identified] : Left breast cancer diagnosed at the age of 58\par 12/02/17 Screening mammography negative\par 12/14/18 Screening mammography showed a possible focal asymmetry in the left UOQ\par 01/03/19 Left breast diagnostic ultrasound showed an irregular hypoechoic mass at 1 o'clock axis measuring 0.6 x 1.5 x 0.8 cm, likely corresponding to a mammographic architectural distortion. Sonoguided core biopsy recommended.\par 01/04/19 Left breast 1 o'clock axis 4 cm FN sonoguided core biopsy showed infiltrating lobular carcinoma, SBR 5/9, ER/MI  %, Ki-67 10% and HER-2 negative.\par 01/14/19 MRI of the breast showed a 1.9 x 1.4 x 0.7 cm irregular mass with an irregular margin in the left breast at 2 o'clock axis, consistent with known carcinoma\par 01/24/19 Left breast 1 o'clock axis UOQ lumpectomy showed a 2 cm invasive carcinoma with ductal and lobular features, SBR 6/9 with 0/2 LN involved, ER 70%, MI very focal weak positivity, KI-67 14% and HER-2 negative. Left inferior margin revealed multiple foci of invasive carcinoma with lobular features, largest one measuring 4 mm, located at 1.5 mm from the final inferior margin, multiple foci of LCIS as well. Left anterior medial margin showed a 1.2 mm focus of invasive carcinoma with lobular features located at 3 mm from the margin and a 1.5 mm focus about 0.5 mm from the anterior medial margin\par 3/1/19 Oncotype showed a recurrence score of 31, which is correlated with a 19% risk of distant recurrence at 9 years and an absolute chemotherapy benefit of >15%\par 3/21/19 Taxotere and Cytoxan with Onpro every 3 weeks x 4\par 5/23/19 [facility] 47 gene panel negative for pathogenic variants\par 06/26/19 - 7/18/19 Adjuvant Radiation with Dr. Lorenzana\par 9/2019 Start anastrozole [de-identified] :  2 cm Invasive carcinoma with ductal and lobular features, SBR 6/9, 0/2 SLN, ER 70%, NC weakly +, HER-2 negative, KI-67 14%. Multifocal given the presence of foci of invasive carcinoma in additional margins, Oncotype = 31 (RR 19%) [de-identified] : Courtney started anastrozole in 9/19 and continues to do fairly well on it with occasional hot flashes and no joint pain\par She reports taking tumeric, which seems to be helping her with OA pain and stiffness. She is no longer on naproxen for pain.\par She continues to take Gabapentin 600 mg QHS for hot flashes which has been very effective and she is sleeping well.\par The numbness in her toes and fingers is usually worse in the winter time.\par The left shoulder pain is better after receiving steroid injection to her shoulder and bicep region recently .\par She is still working from home a few times a week, but enjoys going into the office to see her co-workers. She was also recently promoted.\par \par She is filing for divorce. The breast cancer diagnosis brought out the lack of support in her marriage\par She got the Moderna COVID vaccine in 3/21 and 4/21.\par \par HEALTH MAINTENANCE:\par Mammogram: 02/22. negative as per patient, done [facility]  \par Pap smear: 1/21 negative.\par Bone density DEXA scan: 09/17/21 showed T scores of  0.3 in the spine, 0.2 in femoral neck and 0.7 in total hip and 0.9 in forearm\par                                           09/18/19 showed T scores of -0.6 in the spine, -0.4 in femoral neck and 0.1 hip\par Colonoscopy: 6/12/18 negative\par Genetic testing: Sister had breast cancer at age 26. [facility] genetic panel of 47 genes sent 5/23/19 and negative for any pathogenic variants \par

## 2022-10-13 ENCOUNTER — FORM ENCOUNTER (OUTPATIENT)
Age: 62
End: 2022-10-13

## 2023-01-24 ENCOUNTER — RX RENEWAL (OUTPATIENT)
Age: 63
End: 2023-01-24

## 2023-02-10 ENCOUNTER — OUTPATIENT (OUTPATIENT)
Dept: OUTPATIENT SERVICES | Facility: HOSPITAL | Age: 63
LOS: 1 days | Discharge: HOME | End: 2023-02-10

## 2023-02-10 DIAGNOSIS — C50.919 MALIGNANT NEOPLASM OF UNSPECIFIED SITE OF UNSPECIFIED FEMALE BREAST: ICD-10-CM

## 2023-02-10 DIAGNOSIS — Z98.890 OTHER SPECIFIED POSTPROCEDURAL STATES: Chronic | ICD-10-CM

## 2023-02-16 ENCOUNTER — APPOINTMENT (OUTPATIENT)
Dept: HEMATOLOGY ONCOLOGY | Facility: CLINIC | Age: 63
End: 2023-02-16

## 2023-02-25 ENCOUNTER — APPOINTMENT (OUTPATIENT)
Dept: ORTHOPEDIC SURGERY | Facility: CLINIC | Age: 63
End: 2023-02-25

## 2023-04-04 ENCOUNTER — APPOINTMENT (OUTPATIENT)
Dept: HEMATOLOGY ONCOLOGY | Facility: CLINIC | Age: 63
End: 2023-04-04

## 2023-04-12 ENCOUNTER — NON-APPOINTMENT (OUTPATIENT)
Age: 63
End: 2023-04-12

## 2023-04-12 ENCOUNTER — APPOINTMENT (OUTPATIENT)
Dept: FAMILY MEDICINE | Facility: CLINIC | Age: 63
End: 2023-04-12
Payer: COMMERCIAL

## 2023-04-12 VITALS
BODY MASS INDEX: 32.49 KG/M2 | OXYGEN SATURATION: 98 % | DIASTOLIC BLOOD PRESSURE: 82 MMHG | HEIGHT: 65 IN | SYSTOLIC BLOOD PRESSURE: 124 MMHG | HEART RATE: 76 BPM | WEIGHT: 195 LBS

## 2023-04-12 PROCEDURE — 93000 ELECTROCARDIOGRAM COMPLETE: CPT

## 2023-04-12 PROCEDURE — 99396 PREV VISIT EST AGE 40-64: CPT | Mod: 25

## 2023-04-12 RX ORDER — MELOXICAM 15 MG/1
15 TABLET ORAL DAILY
Qty: 30 | Refills: 1 | Status: DISCONTINUED | COMMUNITY
Start: 2022-10-24 | End: 2023-04-12

## 2023-04-12 RX ORDER — METHOCARBAMOL 750 MG/1
750 TABLET, FILM COATED ORAL 3 TIMES DAILY
Qty: 90 | Refills: 0 | Status: DISCONTINUED | COMMUNITY
Start: 2022-07-18 | End: 2023-04-12

## 2023-04-13 ENCOUNTER — APPOINTMENT (OUTPATIENT)
Dept: FAMILY MEDICINE | Facility: CLINIC | Age: 63
End: 2023-04-13

## 2023-04-17 NOTE — HISTORY OF PRESENT ILLNESS
[FreeTextEntry1] : physical [de-identified] : MS. MATHUR IS A PLEASANT 63 YO PRESENTING FOR YEARLY PHYSICAL.  HAS AN UPCOMING GYN APPOINTMENT THIS MONTH.  SEEING ONCOLOGIST IN FOLLOW-UP OF BREAST CANCER.

## 2023-04-17 NOTE — PLAN
[FreeTextEntry1] : NEED MAMMOGRAM\par COLONOSCOPY DUE THIS SUMMER\par UPCOMING GYN APPOINTMENT\par VISION SCREENING\par SAFETY / HEALTHY HABITS REVIEWED\par HEALTHY DIET AND LIFESTYLE MODIFICATIONS\par VACCINATIONS DISCUSSED: COVID, TDAP, SHINGRIX \par CHECK ROUTINE LAB WORK\par EKG: NSR AT 61 BPM, NO ACUTE ST-T WAVE CHANGES\par FOLLOW-UP ALL SPECIALISTS AS DIRECTED\par CALL WITH ANY QUESTIONS, CONCERNS OR CHANGES

## 2023-07-14 ENCOUNTER — RX RENEWAL (OUTPATIENT)
Age: 63
End: 2023-07-14

## 2023-08-16 ENCOUNTER — OUTPATIENT (OUTPATIENT)
Dept: OUTPATIENT SERVICES | Facility: HOSPITAL | Age: 63
LOS: 1 days | Discharge: HOME | End: 2023-08-16

## 2023-08-16 DIAGNOSIS — Z98.890 OTHER SPECIFIED POSTPROCEDURAL STATES: Chronic | ICD-10-CM

## 2023-08-16 DIAGNOSIS — C50.919 MALIGNANT NEOPLASM OF UNSPECIFIED SITE OF UNSPECIFIED FEMALE BREAST: ICD-10-CM

## 2023-08-17 ENCOUNTER — APPOINTMENT (OUTPATIENT)
Dept: HEMATOLOGY ONCOLOGY | Facility: CLINIC | Age: 63
End: 2023-08-17
Payer: COMMERCIAL

## 2023-08-17 ENCOUNTER — RESULT REVIEW (OUTPATIENT)
Age: 63
End: 2023-08-17

## 2023-08-17 VITALS
SYSTOLIC BLOOD PRESSURE: 128 MMHG | WEIGHT: 190.92 LBS | RESPIRATION RATE: 16 BRPM | HEART RATE: 50 BPM | TEMPERATURE: 97.2 F | BODY MASS INDEX: 31.77 KG/M2 | DIASTOLIC BLOOD PRESSURE: 79 MMHG | OXYGEN SATURATION: 99 %

## 2023-08-17 LAB
BASOPHILS # BLD AUTO: 0.03 K/UL — SIGNIFICANT CHANGE UP (ref 0–0.2)
BASOPHILS NFR BLD AUTO: 0.9 % — SIGNIFICANT CHANGE UP (ref 0–2)
EOSINOPHIL # BLD AUTO: 0.08 K/UL — SIGNIFICANT CHANGE UP (ref 0–0.5)
EOSINOPHIL NFR BLD AUTO: 2.3 % — SIGNIFICANT CHANGE UP (ref 0–6)
HCT VFR BLD CALC: 41.2 % — SIGNIFICANT CHANGE UP (ref 34.5–45)
HGB BLD-MCNC: 13.2 G/DL — SIGNIFICANT CHANGE UP (ref 11.5–15.5)
IMM GRANULOCYTES NFR BLD AUTO: 0.3 % — SIGNIFICANT CHANGE UP (ref 0–0.9)
LYMPHOCYTES # BLD AUTO: 1.36 K/UL — SIGNIFICANT CHANGE UP (ref 1–3.3)
LYMPHOCYTES # BLD AUTO: 38.9 % — SIGNIFICANT CHANGE UP (ref 13–44)
MCHC RBC-ENTMCNC: 28.6 PG — SIGNIFICANT CHANGE UP (ref 27–34)
MCHC RBC-ENTMCNC: 32 G/DL — SIGNIFICANT CHANGE UP (ref 32–36)
MCV RBC AUTO: 89.4 FL — SIGNIFICANT CHANGE UP (ref 80–100)
MONOCYTES # BLD AUTO: 0.28 K/UL — SIGNIFICANT CHANGE UP (ref 0–0.9)
MONOCYTES NFR BLD AUTO: 8 % — SIGNIFICANT CHANGE UP (ref 2–14)
NEUTROPHILS # BLD AUTO: 1.74 K/UL — LOW (ref 1.8–7.4)
NEUTROPHILS NFR BLD AUTO: 49.6 % — SIGNIFICANT CHANGE UP (ref 43–77)
NRBC # BLD: 0 /100 WBCS — SIGNIFICANT CHANGE UP (ref 0–0)
PLATELET # BLD AUTO: 254 K/UL — SIGNIFICANT CHANGE UP (ref 150–400)
RBC # BLD: 4.61 M/UL — SIGNIFICANT CHANGE UP (ref 3.8–5.2)
RBC # FLD: 13.6 % — SIGNIFICANT CHANGE UP (ref 10.3–14.5)
WBC # BLD: 3.5 K/UL — LOW (ref 3.8–10.5)
WBC # FLD AUTO: 3.5 K/UL — LOW (ref 3.8–10.5)

## 2023-08-17 PROCEDURE — 99214 OFFICE O/P EST MOD 30 MIN: CPT

## 2023-08-17 NOTE — HISTORY OF PRESENT ILLNESS
[Disease: _____________________] : Disease: [unfilled] [T: ___] : T[unfilled] [N: ___] : N[unfilled] [M: ___] : M[unfilled] [AJCC Stage: ____] : AJCC Stage: [unfilled] [de-identified] : Left breast cancer diagnosed at the age of 58\par  12/02/17 Screening mammography negative\par  12/14/18 Screening mammography showed a possible focal asymmetry in the left UOQ\par  01/03/19 Left breast diagnostic ultrasound showed an irregular hypoechoic mass at 1 o'clock axis measuring 0.6 x 1.5 x 0.8 cm, likely corresponding to a mammographic architectural distortion. Sonoguided core biopsy recommended.\par  01/04/19 Left breast 1 o'clock axis 4 cm FN sonoguided core biopsy showed infiltrating lobular carcinoma, SBR 5/9, ER/CA  %, Ki-67 10% and HER-2 negative.\par  01/14/19 MRI of the breast showed a 1.9 x 1.4 x 0.7 cm irregular mass with an irregular margin in the left breast at 2 o'clock axis, consistent with known carcinoma\par  01/24/19 Left breast 1 o'clock axis UOQ lumpectomy showed a 2 cm invasive carcinoma with ductal and lobular features, SBR 6/9 with 0/2 LN involved, ER 70%, CA very focal weak positivity, KI-67 14% and HER-2 negative. Left inferior margin revealed multiple foci of invasive carcinoma with lobular features, largest one measuring 4 mm, located at 1.5 mm from the final inferior margin, multiple foci of LCIS as well. Left anterior medial margin showed a 1.2 mm focus of invasive carcinoma with lobular features located at 3 mm from the margin and a 1.5 mm focus about 0.5 mm from the anterior medial margin\par  3/1/19 Oncotype showed a recurrence score of 31, which is correlated with a 19% risk of distant recurrence at 9 years and an absolute chemotherapy benefit of >15%\par  3/21/19 Taxotere and Cytoxan with Onpro every 3 weeks x 4\par  5/23/19 [facility] 47 gene panel negative for pathogenic variants\par  06/26/19 - 7/18/19 Adjuvant Radiation with Dr. Lorenzana\par  9/2019 Start anastrozole [de-identified] :  2 cm Invasive carcinoma with ductal and lobular features, SBR 6/9, 0/2 SLN, ER 70%, CA weakly +, HER-2 negative, KI-67 14%. Multifocal given the presence of foci of invasive carcinoma in additional margins, Oncotype = 31 (RR 19%) [de-identified] : Courtney started anastrozole in 9/19 and continues to do fairly well on it with occasional hot flashes and no joint pain She reports taking tumeric, which seems to be helping her with OA pain and stiffness. The knees are better since she has been walking more. She continues to take Gabapentin 600 mg QHS for hot flashes which has been very effective and she is sleeping well. The numbness in her toes and fingers completely resolves with no flare ups She is still working from home especially now that she is having some renovation work done at home She got a Thai Padilla dog that keeps her busy and active most of her day She is planning a trip to [place] in a few days for her daughter's baptisms.   She is finalizing her divorce.The breast cancer diagnosis brought out the lack of support in her marriage   HEALTH MAINTENANCE: Mammogram: 03/27/23 BI-RADS 2 Benign finding done [facility]   Pap smear: 04/24/2023 negative  Bone density DEXA scan: 05/03/23 will obtain it from [facility]                                           09/17/21 showed T scores of  0.3 in the spine, 0.2 in femoral neck and 0.7 in total hip and 0.9 in forearm                                           09/18/19 showed T scores of -0.6 in the spine, -0.4 in femoral neck and 0.1 hip Colonoscopy: 6/12/18 negative and 7/14/23 negative as per patient next follow up in 5 years Genetic testing: Sister had breast cancer at age 26. [facility] genetic panel of 47 genes sent 5/23/19 and negative for any pathogenic variants.

## 2023-08-21 LAB
ALBUMIN SERPL ELPH-MCNC: 4.7 G/DL
ALP BLD-CCNC: 99 U/L
ALT SERPL-CCNC: 13 U/L
ANION GAP SERPL CALC-SCNC: 12 MMOL/L
AST SERPL-CCNC: 23 U/L
BILIRUB SERPL-MCNC: 0.5 MG/DL
BUN SERPL-MCNC: 11 MG/DL
CALCIUM SERPL-MCNC: 9.8 MG/DL
CHLORIDE SERPL-SCNC: 106 MMOL/L
CHOLEST SERPL-MCNC: 244 MG/DL
CO2 SERPL-SCNC: 25 MMOL/L
CREAT SERPL-MCNC: 0.88 MG/DL
EGFR: 75 ML/MIN/1.73M2
ESTIMATED AVERAGE GLUCOSE: 111 MG/DL
GLUCOSE SERPL-MCNC: 97 MG/DL
HBA1C MFR BLD HPLC: 5.5 %
HDLC SERPL-MCNC: 105 MG/DL
LDLC SERPL CALC-MCNC: 129 MG/DL
NONHDLC SERPL-MCNC: 139 MG/DL
POTASSIUM SERPL-SCNC: 4.1 MMOL/L
PROT SERPL-MCNC: 7.5 G/DL
SODIUM SERPL-SCNC: 143 MMOL/L
TRIGL SERPL-MCNC: 61 MG/DL
TSH SERPL-ACNC: 0.76 UIU/ML

## 2023-11-10 ENCOUNTER — APPOINTMENT (OUTPATIENT)
Dept: FAMILY MEDICINE | Facility: CLINIC | Age: 63
End: 2023-11-10

## 2024-01-19 ENCOUNTER — RX RENEWAL (OUTPATIENT)
Age: 64
End: 2024-01-19

## 2024-01-19 RX ORDER — ANASTROZOLE TABLETS 1 MG/1
1 TABLET ORAL
Qty: 90 | Refills: 0 | Status: ACTIVE | COMMUNITY
Start: 2019-09-12 | End: 1900-01-01

## 2024-02-12 ENCOUNTER — OUTPATIENT (OUTPATIENT)
Dept: OUTPATIENT SERVICES | Facility: HOSPITAL | Age: 64
LOS: 1 days | Discharge: HOME | End: 2024-02-12

## 2024-02-12 DIAGNOSIS — C50.919 MALIGNANT NEOPLASM OF UNSPECIFIED SITE OF UNSPECIFIED FEMALE BREAST: ICD-10-CM

## 2024-02-12 DIAGNOSIS — Z98.890 OTHER SPECIFIED POSTPROCEDURAL STATES: Chronic | ICD-10-CM

## 2024-02-13 ENCOUNTER — APPOINTMENT (OUTPATIENT)
Dept: HEMATOLOGY ONCOLOGY | Facility: CLINIC | Age: 64
End: 2024-02-13

## 2024-02-13 NOTE — HISTORY OF PRESENT ILLNESS
[de-identified] : Left breast cancer diagnosed at the age of 58\par  12/02/17 Screening mammography negative\par  12/14/18 Screening mammography showed a possible focal asymmetry in the left UOQ\par  01/03/19 Left breast diagnostic ultrasound showed an irregular hypoechoic mass at 1 o'clock axis measuring 0.6 x 1.5 x 0.8 cm, likely corresponding to a mammographic architectural distortion. Sonoguided core biopsy recommended.\par  01/04/19 Left breast 1 o'clock axis 4 cm FN sonoguided core biopsy showed infiltrating lobular carcinoma, SBR 5/9, ER/[place]  %, Ki-67 10% and HER-2 negative.\par  01/14/19 MRI of the breast showed a 1.9 x 1.4 x 0.7 cm irregular mass with an irregular margin in the left breast at 2 o'clock axis, consistent with known carcinoma\par  01/24/19 Left breast 1 o'clock axis UOQ lumpectomy showed a 2 cm invasive carcinoma with ductal and lobular features, SBR 6/9 with 0/2 LN involved, ER 70%, MN very focal weak positivity, KI-67 14% and HER-2 negative. Left inferior margin revealed multiple foci of invasive carcinoma with lobular features, largest one measuring 4 mm, located at 1.5 mm from the final inferior margin, multiple foci of LCIS as well. Left anterior medial margin showed a 1.2 mm focus of invasive carcinoma with lobular features located at 3 mm from the margin and a 1.5 mm focus about 0.5 mm from the anterior medial margin\par  3/1/19 Oncotype showed a recurrence score of 31, which is correlated with a 19% risk of distant recurrence at 9 years and an absolute chemotherapy benefit of >15%\par  3/21/19 Taxotere and Cytoxan with Onpro every 3 weeks x 4\par  5/23/19 [facility] 47 gene panel negative for pathogenic variants\par  06/26/19 - 7/18/19 Adjuvant Radiation with Dr. Lorenzana\par  9/2019 Start anastrozole [de-identified] :  2 cm Invasive carcinoma with ductal and lobular features, SBR 6/9, 0/2 SLN, ER 70%, MD weakly +, HER-2 negative, KI-67 14%. Multifocal given the presence of foci of invasive carcinoma in additional margins, Oncotype = 31 (RR 19%) [de-identified] : Courtney started anastrozole in 9/19 and continues to do fairly well on it with occasional hot flashes and no joint pain   She reports taking tumeric, which seems to be helping her with OA pain and stiffness. The knees are better since she has been walking more. She continues to take Gabapentin 600 mg QHS for hot flashes which has been very effective and she is sleeping well. The numbness in her toes and fingers completely resolves with no flare ups She is still working from home especially now that she is having some renovation work done at home She got a [place] Padilla dog that keeps her busy and active most of her day She is planning a trip to [place] in a few days for her daughter's baptisms.   She is finalizing her divorce.The breast cancer diagnosis brought out the lack of support in her marriage   HEALTH MAINTENANCE: Mammogram: 03/27/23 BI-RADS 2 Benign finding done [facility]   Pap smear: 04/24/2023 negative  Bone density DEXA scan: 05/03/23 will obtain it from [facility]                                           09/17/21 showed T scores of  0.3 in the spine, 0.2 in femoral neck and 0.7 in total hip and 0.9 in forearm                                           09/18/19 showed T scores of -0.6 in the spine, -0.4 in femoral neck and 0.1 hip Colonoscopy: 6/12/18 negative and 7/14/23 negative as per patient next follow up in 5 years Genetic testing: Sister had breast cancer at age 26. [facility] genetic panel of 47 genes sent 5/23/19 and negative for any pathogenic variants.

## 2024-03-27 NOTE — OB HISTORY
[Menopause Age: ____] : age at menopause was [unfilled] [Menarche Age: ____] : age at menarche was [unfilled] [___] : Total Pregnancies: [unfilled]

## 2024-03-28 ENCOUNTER — APPOINTMENT (OUTPATIENT)
Dept: HEMATOLOGY ONCOLOGY | Facility: CLINIC | Age: 64
End: 2024-03-28
Payer: COMMERCIAL

## 2024-03-28 VITALS
HEART RATE: 66 BPM | DIASTOLIC BLOOD PRESSURE: 86 MMHG | TEMPERATURE: 96.4 F | RESPIRATION RATE: 16 BRPM | OXYGEN SATURATION: 98 % | WEIGHT: 194.89 LBS | BODY MASS INDEX: 32.43 KG/M2 | SYSTOLIC BLOOD PRESSURE: 135 MMHG

## 2024-03-28 DIAGNOSIS — G62.0 DRUG-INDUCED POLYNEUROPATHY: ICD-10-CM

## 2024-03-28 DIAGNOSIS — M17.9 OSTEOARTHRITIS OF KNEE, UNSPECIFIED: ICD-10-CM

## 2024-03-28 DIAGNOSIS — N95.1 MENOPAUSAL AND FEMALE CLIMACTERIC STATES: ICD-10-CM

## 2024-03-28 DIAGNOSIS — F41.9 ANXIETY DISORDER, UNSPECIFIED: ICD-10-CM

## 2024-03-28 DIAGNOSIS — C50.912 MALIGNANT NEOPLASM OF UNSPECIFIED SITE OF LEFT FEMALE BREAST: ICD-10-CM

## 2024-03-28 DIAGNOSIS — T45.1X5A DRUG-INDUCED POLYNEUROPATHY: ICD-10-CM

## 2024-03-28 PROCEDURE — G2211 COMPLEX E/M VISIT ADD ON: CPT | Mod: NC,1L

## 2024-03-28 PROCEDURE — 99214 OFFICE O/P EST MOD 30 MIN: CPT

## 2024-04-01 PROBLEM — N95.1 HOT FLASHES, MENOPAUSAL: Status: ACTIVE | Noted: 2019-03-14

## 2024-04-01 PROBLEM — G62.0 CHEMOTHERAPY-INDUCED PERIPHERAL NEUROPATHY: Status: ACTIVE | Noted: 2019-09-13

## 2024-04-01 PROBLEM — F41.9 ANXIETY: Status: ACTIVE | Noted: 2021-02-16

## 2024-04-01 PROBLEM — C50.912 BREAST CANCER, LEFT: Status: ACTIVE | Noted: 2019-02-07

## 2024-04-01 NOTE — HISTORY OF PRESENT ILLNESS
[Disease: _____________________] : Disease: [unfilled] [T: ___] : T[unfilled] [M: ___] : M[unfilled] [N: ___] : N[unfilled] [AJCC Stage: ____] : AJCC Stage: [unfilled] [de-identified] : Left breast cancer diagnosed at the age of 58\par  12/02/17 Screening mammography negative\par  12/14/18 Screening mammography showed a possible focal asymmetry in the left UOQ\par  01/03/19 Left breast diagnostic ultrasound showed an irregular hypoechoic mass at 1 o'clock axis measuring 0.6 x 1.5 x 0.8 cm, likely corresponding to a mammographic architectural distortion. Sonoguided core biopsy recommended.\par  01/04/19 Left breast 1 o'clock axis 4 cm FN sonoguided core biopsy showed infiltrating lobular carcinoma, SBR 5/9, ER/LA  %, Ki-67 10% and HER-2 negative.\par  01/14/19 MRI of the breast showed a 1.9 x 1.4 x 0.7 cm irregular mass with an irregular margin in the left breast at 2 o'clock axis, consistent with known carcinoma\par  01/24/19 Left breast 1 o'clock axis UOQ lumpectomy showed a 2 cm invasive carcinoma with ductal and lobular features, SBR 6/9 with 0/2 LN involved, ER 70%, LA very focal weak positivity, KI-67 14% and HER-2 negative. Left inferior margin revealed multiple foci of invasive carcinoma with lobular features, largest one measuring 4 mm, located at 1.5 mm from the final inferior margin, multiple foci of LCIS as well. Left anterior medial margin showed a 1.2 mm focus of invasive carcinoma with lobular features located at 3 mm from the margin and a 1.5 mm focus about 0.5 mm from the anterior medial margin\par  3/1/19 Oncotype showed a recurrence score of 31, which is correlated with a 19% risk of distant recurrence at 9 years and an absolute chemotherapy benefit of >15%\par  3/21/19 Taxotere and Cytoxan with Onpro every 3 weeks x 4\par  5/23/19 [facility] 47 gene panel negative for pathogenic variants\par  06/26/19 - 7/18/19 Adjuvant Radiation with Dr. Lorenzana\par  9/2019 Start anastrozole [de-identified] :  2 cm Invasive carcinoma with ductal and lobular features, SBR 6/9, 0/2 SLN, ER 70%, DC weakly +, HER-2 negative, KI-67 14%. Multifocal given the presence of foci of invasive carcinoma in additional margins, Oncotype = 31 (RR 19%) [de-identified] : Courtney started anastrozole in 9/19 and continues to do fairly well on it with occasional hot flashes and no joint pain. She reports taking turmeric, which has helped with her with OA pain and stiffness. She is overall doing well with no new or acute complaints at this time.  The knees are better since she has been walking more. She continues to take Gabapentin 600 mg QHS for hot flashes which has been very effective, and she is sleeping well. The numbness in her toes and fingers completely resolved. She is finalized her divorce late last year The breast cancer diagnosis brought out the lack of support in her marriage. She got a [place].  HEALTH MAINTENANCE: Mammogram: 03/27/23 BI-RADS 2 at [facility] Pap smear: 04/24/2023 negative  Bone density DEXA scan: 05/03/23 will obtain from [facility]                                           09/17/21 showed T scores of 0.3 in the spine, 0.2 in femoral neck and 0.7 in total hip and 0.9 in forearm                                           09/18/19 showed T scores of -0.6 in the spine, -0.4 in femoral neck and 0.1 hip Colonoscopy: 6/12/18 negative and 7/14/23 negative as per patient next follow up in 5 years Genetic testing: Sister had breast cancer at age 26. [facility] genetic panel of 47 genes sent 5/23/19 and negative for any pathogenic variants.

## 2024-04-16 ENCOUNTER — LABORATORY RESULT (OUTPATIENT)
Age: 64
End: 2024-04-16

## 2024-04-16 ENCOUNTER — NON-APPOINTMENT (OUTPATIENT)
Age: 64
End: 2024-04-16

## 2024-04-16 ENCOUNTER — APPOINTMENT (OUTPATIENT)
Dept: FAMILY MEDICINE | Facility: CLINIC | Age: 64
End: 2024-04-16
Payer: COMMERCIAL

## 2024-04-16 VITALS
OXYGEN SATURATION: 98 % | HEART RATE: 68 BPM | BODY MASS INDEX: 32.15 KG/M2 | SYSTOLIC BLOOD PRESSURE: 112 MMHG | WEIGHT: 193 LBS | DIASTOLIC BLOOD PRESSURE: 68 MMHG | HEIGHT: 65 IN

## 2024-04-16 DIAGNOSIS — Z00.00 ENCOUNTER FOR GENERAL ADULT MEDICAL EXAMINATION W/OUT ABNORMAL FINDINGS: ICD-10-CM

## 2024-04-16 PROCEDURE — 36415 COLL VENOUS BLD VENIPUNCTURE: CPT

## 2024-04-16 PROCEDURE — 99396 PREV VISIT EST AGE 40-64: CPT

## 2024-04-16 PROCEDURE — 93000 ELECTROCARDIOGRAM COMPLETE: CPT

## 2024-04-18 ENCOUNTER — NON-APPOINTMENT (OUTPATIENT)
Age: 64
End: 2024-04-18

## 2024-04-21 NOTE — HISTORY OF PRESENT ILLNESS
[FreeTextEntry1] : PHYSICAL [de-identified] : MS. TAN IS A PLEASANT 62 YO PRESENTING FOR YEARLY PHYSICAL.  FOLLOWING WITH ONCOLOGY FOR HISTORY OF BREAST CANCER.  GETTING MAMMOGRAM TODAY.  IS FEELING WELL TODAY.

## 2024-04-21 NOTE — PLAN
[FreeTextEntry1] : VISION SCREENING SAFETY / HEALTHY HABITS REVIEWED HEALTHY DIET AND LIFESTYLE MODIFICATIONS VACCINATIONS DISCUSSED: COVID, FLU, TDAP, SHINGRIX, RSV CHECK ROUTINE LAB WORK EKG: SINUS AT 55 BPM, LOW VOLTAGE, NO ACUTE ST-T WAVE CHANGE FOLLOW-UP ALL SPECIALISTS AS DIRECTED CALL WITH ANY QUESTIONS, CONCERNS OR CHANGES

## 2024-04-21 NOTE — HEALTH RISK ASSESSMENT
[Excellent] : ~his/her~ current health as excellent [Very Good] : ~his/her~  mood as very good [Yes] : Yes [2 - 3 times a week (3 pts)] : 2 - 3  times a week (3 points) [1 or 2 (0 pts)] : 1 or 2 (0 points) [Never (0 pts)] : Never (0 points) [No] : In the past 12 months have you used drugs other than those required for medical reasons? No [Little interest or pleasure doing things] : 1) Little interest or pleasure doing things [Feeling down, depressed, or hopeless] : 2) Feeling down, depressed, or hopeless [0] : 2) Feeling down, depressed, or hopeless: Not at all (0) [PHQ-2 Negative - No further assessment needed] : PHQ-2 Negative - No further assessment needed [HIV test declined] : HIV test declined [Hepatitis C test declined] : Hepatitis C test declined [None] : None [With Family] : lives with family [# of Members in Household ___] :  household currently consist of [unfilled] member(s) [Employed] : employed [# Of Children ___] : has [unfilled] children [Feels Safe at Home] : Feels safe at home [Reports normal functional visual acuity (ie: able to read med bottle)] : Reports normal functional visual acuity [Smoke Detector] : smoke detector [Carbon Monoxide Detector] : carbon monoxide detector [Safety elements used in home] : safety elements used in home [Seat Belt] :  uses seat belt [Sunscreen] : uses sunscreen [With Patient/Caregiver] : , with patient/caregiver [Former] : Former [0-4] : 0-4 [> 15 Years] : > 15 Years [Audit-CScore] : 3 [de-identified] : walking two miles a day [de-identified] : diet has been "okay".  cut down on sugar and carbohydrates [[id number]] : 0 [Change in mental status noted] : No change in mental status noted [Language] : denies difficulty with language [Learning/Retaining New Information] : denies difficulty learning/retaining new information [Handling Complex Tasks] : denies difficulty handling complex tasks [Sexually Active] : not sexually active [High Risk Behavior] : no high risk behavior [Reports changes in hearing] : Reports no changes in hearing [Reports changes in vision] : Reports no changes in vision [Reports changes in dental health] : Reports no changes in dental health [MammogramDate] : 03/23 [PapSmearDate] : 04/23 [PapSmearComments] : DR. BRO GONZALES - GOING TO SEE NEW PROVIDER [BoneDensityDate] : 05/23 [ColonoscopyDate] : 07/23 [de-identified] : GLASSES FOR READING AND DISTANCE [AdvancecareDate] : 04/24

## 2024-06-10 ENCOUNTER — APPOINTMENT (OUTPATIENT)
Dept: ORTHOPEDIC SURGERY | Facility: CLINIC | Age: 64
End: 2024-06-10
Payer: COMMERCIAL

## 2024-06-10 VITALS — HEIGHT: 65 IN | BODY MASS INDEX: 32.15 KG/M2 | WEIGHT: 193 LBS

## 2024-06-10 DIAGNOSIS — M54.16 RADICULOPATHY, LUMBAR REGION: ICD-10-CM

## 2024-06-10 DIAGNOSIS — M46.00 SPINAL ENTHESOPATHY, SITE UNSPECIFIED: ICD-10-CM

## 2024-06-10 PROCEDURE — 99214 OFFICE O/P EST MOD 30 MIN: CPT

## 2024-06-10 PROCEDURE — 72100 X-RAY EXAM L-S SPINE 2/3 VWS: CPT

## 2024-06-10 RX ORDER — METHOCARBAMOL 750 MG/1
750 TABLET, FILM COATED ORAL
Qty: 30 | Refills: 1 | Status: DISCONTINUED | COMMUNITY
Start: 2024-06-10 | End: 2024-06-10

## 2024-06-10 RX ORDER — TIZANIDINE 4 MG/1
4 TABLET ORAL
Qty: 30 | Refills: 1 | Status: ACTIVE | COMMUNITY
Start: 2024-06-10 | End: 1900-01-01

## 2024-06-10 RX ORDER — MELOXICAM 15 MG/1
15 TABLET ORAL
Qty: 30 | Refills: 1 | Status: ACTIVE | COMMUNITY
Start: 2024-06-10 | End: 1900-01-01

## 2024-06-16 NOTE — HISTORY OF PRESENT ILLNESS
[10] : 10 [8] : 8 [Burning] : burning [Dull/Aching] : dull/aching [Radiating] : radiating [Sharp] : sharp [Tightness] : tightness [Tingling] : tingling [Constant] : constant [Ice] : ice [Extending back] : extending back [Full time] : Work status: full time [de-identified] : 06/10/2024 - 63-year-old female presenting for reevaluation of her lumbar spine. She was last seen in 2022 for her cervical spine. She is currently complaining of left-sided low back pain and radicular pain into her left leg that started 2 weeks ago. Back pain > leg pain. She reports that her leg strength is intact. The patient states that she gets stuck when sitting and has difficulty bearing weight on her left leg. She has not experienced any trauma. She has been treating her pain with Advil, which provides some relief, but the pain remains significant at nighttime. She has had a controlled level of back pain in the past, but the symptoms over the last 2 weeks are new and limiting. She reports no right-sided lower extremity symptoms. [] : no [FreeTextEntry9] : stretching, biofreeze [de-identified] : standing on lt leg [de-identified] :

## 2024-06-16 NOTE — PHYSICAL EXAM
[Normal Coordination] : normal coordination [Normal DTR UE/LE] : normal DTR UE/LE  [Normal Sensation] : normal sensation [Normal Mood and Affect] : normal mood and affect [Oriented] : oriented [Able to Communicate] : able to communicate [Normal Skin] : normal skin [No Rash] : no rash [No Ulcers] : no ulcers [No Lesions] : no lesions [No obvious lymphadenopathy in areas examined] : no obvious lymphadenopathy in areas examined [Well Developed] : well developed [Peripheral vascular exam is grossly normal] : peripheral vascular exam is grossly normal [No Respiratory Distress] : no respiratory distress [Lungs clear to auscultation bilaterally] : lungs clear to auscultation bilaterally [Normal Bowel Sounds] : normal bowel sounds [Non-Tender] : non-tender [No HSM] : no HSM [No Mass] : no mass [Flexion] : flexion [5___] : right extensor hallicus longus 5[unfilled]/5 [] : non-antalgic

## 2024-06-16 NOTE — DATA REVIEWED
[I independently reviewed and interpreted images and report] : I independently reviewed and interpreted images and report [FreeTextEntry1] : I stop paperwork reviewed Historical progress notes reviewed

## 2024-06-16 NOTE — DISCUSSION/SUMMARY
[de-identified] :  63-year-old female with acute left-sided back pain and intermittent left leg radiculopathy. X-ray imaging today reveals mild scoliosis, with good lordosis and disc heights maintained.  Plan: Start Meloxicam and Tizanidine (both prescribed today). Referred to physical therapy directed at the thoracic/lumbar spine and lower extremity. Trigger Point Injection (TPI) performed today, which was well tolerated. Schedule a follow-up appointment in 3 weeks to reassess her symptoms and response to treatment.  Today in office as part of ongoing treatment a trigger point injection is administered into left sided paraspinal musculature to facilitate ROM and stretching. Injection consisted of 1cc Kenalog 40 and 4cc .25% Marcaine.   Prior to appointment and during encounter with patient extensive medical records were reviewed including but not limited to, hospital records, outpatient records, imaging results, and lab data. During this appointment the patient was examined, diagnoses were discussed and explained in a face to face manner. In addition extensive time was spent reviewing aforementioned diagnostic studies. Counseling including abnormal image results, differential diagnoses, treatment options, risk and benefits, lifestyle changes, current condition, and current medications was performed. Patient's comments, questions, and concerns were addressed and patient verbalized understanding. Based on this patient's presentation at our office, which is an orthopedic spine surgeon's office, this patient inherently / intrinsically has a risk, however minute, of developing issues such as Cauda equina syndrome, bowel and bladder changes, or progression of motor or neurological deficits such as paralysis which may be permanent.  SALMA HE Acting as a Scribe for Dr. Viraj WHITMORE, Salma He, attest that this documentation has been prepared under the direction and in the presence of Provider Sai Cali MD.

## 2024-06-22 ENCOUNTER — RX RENEWAL (OUTPATIENT)
Age: 64
End: 2024-06-22

## 2024-06-22 RX ORDER — GABAPENTIN 300 MG/1
300 CAPSULE ORAL
Qty: 180 | Refills: 1 | Status: ACTIVE | COMMUNITY
Start: 2019-03-14 | End: 1900-01-01

## 2024-07-01 ENCOUNTER — APPOINTMENT (OUTPATIENT)
Dept: FAMILY MEDICINE | Facility: CLINIC | Age: 64
End: 2024-07-01

## 2024-07-01 VITALS
SYSTOLIC BLOOD PRESSURE: 130 MMHG | DIASTOLIC BLOOD PRESSURE: 78 MMHG | BODY MASS INDEX: 30.7 KG/M2 | HEART RATE: 76 BPM | OXYGEN SATURATION: 99 % | WEIGHT: 191 LBS | HEIGHT: 66 IN

## 2024-07-01 VITALS — SYSTOLIC BLOOD PRESSURE: 126 MMHG | DIASTOLIC BLOOD PRESSURE: 72 MMHG

## 2024-07-01 VITALS — DIASTOLIC BLOOD PRESSURE: 74 MMHG | SYSTOLIC BLOOD PRESSURE: 128 MMHG

## 2024-07-01 DIAGNOSIS — E66.9 OBESITY, UNSPECIFIED: ICD-10-CM

## 2024-07-01 DIAGNOSIS — E78.00 PURE HYPERCHOLESTEROLEMIA, UNSPECIFIED: ICD-10-CM

## 2024-07-01 PROCEDURE — 99213 OFFICE O/P EST LOW 20 MIN: CPT

## 2024-07-15 ENCOUNTER — TRANSCRIPTION ENCOUNTER (OUTPATIENT)
Age: 64
End: 2024-07-15

## 2024-07-22 PROBLEM — E66.9 OBESITY (BMI 30-39.9): Status: ACTIVE | Noted: 2024-07-22

## 2024-07-25 ENCOUNTER — RX RENEWAL (OUTPATIENT)
Age: 64
End: 2024-07-25

## 2024-08-30 ENCOUNTER — APPOINTMENT (OUTPATIENT)
Dept: ORTHOPEDIC SURGERY | Facility: CLINIC | Age: 64
End: 2024-08-30
Payer: COMMERCIAL

## 2024-08-30 VITALS — BODY MASS INDEX: 30.7 KG/M2 | WEIGHT: 191 LBS | HEIGHT: 66 IN

## 2024-08-30 DIAGNOSIS — M54.16 RADICULOPATHY, LUMBAR REGION: ICD-10-CM

## 2024-08-30 DIAGNOSIS — M46.00 SPINAL ENTHESOPATHY, SITE UNSPECIFIED: ICD-10-CM

## 2024-08-30 DIAGNOSIS — M62.830 MUSCLE SPASM OF BACK: ICD-10-CM

## 2024-08-30 PROCEDURE — 99214 OFFICE O/P EST MOD 30 MIN: CPT | Mod: 25

## 2024-08-30 PROCEDURE — 20552 NJX 1/MLT TRIGGER POINT 1/2: CPT

## 2024-08-30 PROCEDURE — 99213 OFFICE O/P EST LOW 20 MIN: CPT | Mod: 25

## 2024-08-30 RX ORDER — METHYLPREDNISOLONE 4 MG/1
4 TABLET ORAL
Qty: 1 | Refills: 0 | Status: ACTIVE | COMMUNITY
Start: 2024-08-30 | End: 1900-01-01

## 2024-08-30 RX ORDER — MELOXICAM 15 MG/1
15 TABLET ORAL
Qty: 30 | Refills: 1 | Status: ACTIVE | COMMUNITY
Start: 2024-08-30 | End: 1900-01-01

## 2024-09-02 PROBLEM — M62.830 LUMBAR PARASPINAL MUSCLE SPASM: Status: ACTIVE | Noted: 2024-09-02

## 2024-09-02 NOTE — DISCUSSION/SUMMARY
[de-identified] :  63-year-old female with acute left-sided back pain and intermittent left leg radiculopathy. X-ray imaging in 06/24 reveals mild scoliosis, with good lordosis and disc heights maintained.  Plan: Patient reports relief from the trigger point injection in 06/24 until recently. Patient would like to do another injection today.  Today in office as part of ongoing treatment a trigger point injection is administered into left sided paraspinal multifidis and longissimus musculature to facilitate ROM and stretching. Injection consisted of 1cc Kenalog 40 and 4cc .25% Marcaine. Advised to start physical therapy directed at the thoracic/lumbar spine and lower extremity.  Patient was given a refill for meloxicam.  Patient reports she is going to [place] soon and is concerned that her pain will limit her. Patient was prescribed a Medrol dose jhon to take with her just in case her pain progressively worsens while she is away.   Prior to appointment and during encounter with patient extensive medical records were reviewed including but not limited to, hospital records, outpatient records, imaging results, and lab data. During this appointment the patient was examined, diagnoses were discussed and explained in a face to face manner. In addition extensive time was spent reviewing aforementioned diagnostic studies. Counseling including abnormal image results, differential diagnoses, treatment options, risk and benefits, lifestyle changes, current condition, and current medications was performed. Patient's comments, questions, and concerns were addressed and patient verbalized understanding. Based on this patient's presentation at our office, which is an orthopedic spine surgeon's office, this patient inherently / intrinsically has a risk, however minute, of developing issues such as Cauda equina syndrome, bowel and bladder changes, or progression of motor or neurological deficits such as paralysis which may be permanent.  SALMA BILLY Acting as a Scribe for Salma Phillips, attest that this documentation has been prepared under the direction and in the presence of Provider Sai Cali MD.

## 2024-09-02 NOTE — DISCUSSION/SUMMARY
[de-identified] :  63-year-old female with acute left-sided back pain and intermittent left leg radiculopathy. X-ray imaging in 06/24 reveals mild scoliosis, with good lordosis and disc heights maintained.  Plan: Patient reports relief from the trigger point injection in 06/24 until recently. Patient would like to do another injection today.  Today in office as part of ongoing treatment a trigger point injection is administered into left sided paraspinal multifidis and longissimus musculature to facilitate ROM and stretching. Injection consisted of 1cc Kenalog 40 and 4cc .25% Marcaine. Advised to start physical therapy directed at the thoracic/lumbar spine and lower extremity.  Patient was given a refill for meloxicam.  Patient reports she is going to [place] soon and is concerned that her pain will limit her. Patient was prescribed a Medrol dose jhon to take with her just in case her pain progressively worsens while she is away.   Prior to appointment and during encounter with patient extensive medical records were reviewed including but not limited to, hospital records, outpatient records, imaging results, and lab data. During this appointment the patient was examined, diagnoses were discussed and explained in a face to face manner. In addition extensive time was spent reviewing aforementioned diagnostic studies. Counseling including abnormal image results, differential diagnoses, treatment options, risk and benefits, lifestyle changes, current condition, and current medications was performed. Patient's comments, questions, and concerns were addressed and patient verbalized understanding. Based on this patient's presentation at our office, which is an orthopedic spine surgeon's office, this patient inherently / intrinsically has a risk, however minute, of developing issues such as Cauda equina syndrome, bowel and bladder changes, or progression of motor or neurological deficits such as paralysis which may be permanent.  SALMA BILLY Acting as a Scribe for Salma Phillips, attest that this documentation has been prepared under the direction and in the presence of Provider Sai Cali MD.

## 2024-09-02 NOTE — HISTORY OF PRESENT ILLNESS
[10] : 10 [8] : 8 [Burning] : burning [Dull/Aching] : dull/aching [Radiating] : radiating [Sharp] : sharp [Tightness] : tightness [Tingling] : tingling [Constant] : constant [Ice] : ice [Extending back] : extending back [Full time] : Work status: full time [de-identified] : 08/30/2024: Patient here for follow up for lumbar spine pain. The patient reports relief with the trigger point injection done last visit in 06/24. The patient reports her pain has returned recently and is shooting down her legs. Patient reports her radiculopathy has also worsened over the last month. The patient reports she has been taking Robaxin with mild relief. The patient reports she did not start the PT that was prescribed last visit. Patient reports she would like to start PT as she feels it would be beneficial. Patient reports she is going to [place] soon and is concerned that her pain will limit her.  06/10/2024 - 63-year-old female presenting for reevaluation of her lumbar spine. She was last seen in 2022 for her cervical spine. She is currently complaining of left-sided low back pain and radicular pain into her left leg that started 2 weeks ago. Back pain > leg pain. She reports that her leg strength is intact. The patient states that she gets stuck when sitting and has difficulty bearing weight on her left leg. She has not experienced any trauma. She has been treating her pain with Advil, which provides some relief, but the pain remains significant at nighttime. She has had a controlled level of back pain in the past, but the symptoms over the last 2 weeks are new and limiting. She reports no right-sided lower extremity symptoms. [] : no [de-identified] : standing on lt leg [FreeTextEntry9] : stretching, biofreeze [de-identified] :

## 2024-09-02 NOTE — PHYSICAL EXAM
[Normal Coordination] : normal coordination [Normal DTR UE/LE] : normal DTR UE/LE  [Normal Sensation] : normal sensation [Normal Mood and Affect] : normal mood and affect [Oriented] : oriented [Able to Communicate] : able to communicate [Normal Skin] : normal skin [No Rash] : no rash [No Ulcers] : no ulcers [No Lesions] : no lesions [No obvious lymphadenopathy in areas examined] : no obvious lymphadenopathy in areas examined [Well Developed] : well developed [Peripheral vascular exam is grossly normal] : peripheral vascular exam is grossly normal [No Respiratory Distress] : no respiratory distress [Flexion] : flexion [5___] : right extensor hallicus longus 5[unfilled]/5 [] : clonus not sustained at ankle

## 2024-09-02 NOTE — HISTORY OF PRESENT ILLNESS
[10] : 10 [8] : 8 [Burning] : burning [Dull/Aching] : dull/aching [Radiating] : radiating [Sharp] : sharp [Tightness] : tightness [Tingling] : tingling [Constant] : constant [Ice] : ice [Extending back] : extending back [Full time] : Work status: full time [de-identified] : 08/30/2024: Patient here for follow up for lumbar spine pain. The patient reports relief with the trigger point injection done last visit in 06/24. The patient reports her pain has returned recently and is shooting down her legs. Patient reports her radiculopathy has also worsened over the last month. The patient reports she has been taking Robaxin with mild relief. The patient reports she did not start the PT that was prescribed last visit. Patient reports she would like to start PT as she feels it would be beneficial. Patient reports she is going to [place] soon and is concerned that her pain will limit her.  06/10/2024 - 63-year-old female presenting for reevaluation of her lumbar spine. She was last seen in 2022 for her cervical spine. She is currently complaining of left-sided low back pain and radicular pain into her left leg that started 2 weeks ago. Back pain > leg pain. She reports that her leg strength is intact. The patient states that she gets stuck when sitting and has difficulty bearing weight on her left leg. She has not experienced any trauma. She has been treating her pain with Advil, which provides some relief, but the pain remains significant at nighttime. She has had a controlled level of back pain in the past, but the symptoms over the last 2 weeks are new and limiting. She reports no right-sided lower extremity symptoms. [] : no [de-identified] : standing on lt leg [FreeTextEntry9] : stretching, biofreeze [de-identified] :

## 2024-09-02 NOTE — PHYSICAL EXAM
[Normal Coordination] : normal coordination [Normal DTR UE/LE] : normal DTR UE/LE  [Normal Sensation] : normal sensation [Normal Mood and Affect] : normal mood and affect [Oriented] : oriented [Able to Communicate] : able to communicate [Normal Skin] : normal skin [No Rash] : no rash [No Ulcers] : no ulcers [No Lesions] : no lesions [No obvious lymphadenopathy in areas examined] : no obvious lymphadenopathy in areas examined [Well Developed] : well developed [Peripheral vascular exam is grossly normal] : peripheral vascular exam is grossly normal [No Respiratory Distress] : no respiratory distress [Flexion] : flexion [5___] : right extensor hallicus longus 5[unfilled]/5 [] : non-antalgic

## 2024-10-01 ENCOUNTER — APPOINTMENT (OUTPATIENT)
Dept: HEMATOLOGY ONCOLOGY | Facility: CLINIC | Age: 64
End: 2024-10-01
Payer: COMMERCIAL

## 2024-10-01 VITALS
RESPIRATION RATE: 16 BRPM | BODY MASS INDEX: 30.96 KG/M2 | WEIGHT: 191.8 LBS | HEART RATE: 71 BPM | TEMPERATURE: 99.5 F | SYSTOLIC BLOOD PRESSURE: 149 MMHG | OXYGEN SATURATION: 100 % | DIASTOLIC BLOOD PRESSURE: 95 MMHG

## 2024-10-01 DIAGNOSIS — N95.1 MENOPAUSAL AND FEMALE CLIMACTERIC STATES: ICD-10-CM

## 2024-10-01 DIAGNOSIS — T45.1X5A DRUG-INDUCED POLYNEUROPATHY: ICD-10-CM

## 2024-10-01 DIAGNOSIS — C50.912 MALIGNANT NEOPLASM OF UNSPECIFIED SITE OF LEFT FEMALE BREAST: ICD-10-CM

## 2024-10-01 DIAGNOSIS — G62.0 DRUG-INDUCED POLYNEUROPATHY: ICD-10-CM

## 2024-10-01 PROCEDURE — 99214 OFFICE O/P EST MOD 30 MIN: CPT

## 2024-10-01 PROCEDURE — G2211 COMPLEX E/M VISIT ADD ON: CPT | Mod: NC

## 2024-10-01 NOTE — HISTORY OF PRESENT ILLNESS
[Disease: _____________________] : Disease: [unfilled] [T: ___] : T[unfilled] [N: ___] : N[unfilled] [M: ___] : M[unfilled] [AJCC Stage: ____] : AJCC Stage: [unfilled] [de-identified] : Left breast cancer diagnosed at the age of 58 12/02/17 Screening mammography negative 12/14/18 Screening mammography showed a possible focal asymmetry in the left UOQ 01/03/19 Left breast diagnostic ultrasound showed an irregular hypoechoic mass at 1 o'clock axis measuring 0.6 x 1.5 x 0.8 cm, likely corresponding to a mammographic architectural distortion. Sonoguided core biopsy recommended. 01/04/19 Left breast 1 o'clock axis 4 cm FN sonoguided core biopsy showed infiltrating lobular carcinoma, SBR 5/9, ER/UT  %, Ki-67 10% and HER-2 negative. 01/14/19 MRI of the breast showed a 1.9 x 1.4 x 0.7 cm irregular mass with an irregular margin in the left breast at 2 o'clock axis, consistent with known carcinoma 01/24/19 Left breast 1 o'clock axis UOQ lumpectomy by   showed a 2 cm invasive carcinoma with ductal and lobular features, SBR 6/9 with 0/2 LN involved, ER 70%, UT very focal weak positivity, KI-67 14% and HER-2 negative. Left inferior margin revealed multiple foci of invasive carcinoma with lobular features, largest one measuring 4 mm, located at 1.5 mm from the final inferior margin, multiple foci of LCIS as well. Left anterior medial margin showed a 1.2 mm focus of invasive carcinoma with lobular features located at 3 mm from the margin and a 1.5 mm focus about 0.5 mm from the anterior medial margin 3/1/19 Oncotype showed a recurrence score of 31, which is correlated with a 19% risk of distant recurrence at 9 years and an absolute chemotherapy benefit of >15% 3/21/19 Taxotere and Cytoxan with Onpro every 3 weeks x 4 5/23/19 [facility] 47 gene panel negative for pathogenic variants 06/26/19 - 7/18/19 Adjuvant Radiation with Dr. Lorenzana 9/2019 Anastrozole started [de-identified] :  2 cm Invasive carcinoma with ductal and lobular features, SBR 6/9, 0/2 SLN, ER 70%, NE weakly +, HER-2 negative, KI-67 14%. Multifocal given the presence of foci of invasive carcinoma in additional margins, Oncotype = 31 (RR 19%) [de-identified] : Courtney started anastrozole in 9/19 and continues to do fairly well on it with occasional hot flashes and no joint pain. She reports taking turmeric, which has helped with her with OA pain and stiffness. She is overall doing well with no new or acute complaints at this time.  The knees are better since she has been walking more. She continues to take Gabapentin 600 mg QHS for hot flashes which has been very effective, and she is sleeping well. The numbness in her toes and fingers completely resolved. She is finalized her divorce late last year The breast cancer diagnosis brought out the lack of support in her marriage. She got a [employer].  HEALTH MAINTENANCE: Mammogram: 4/16/24 BI-RADS 4 at [facility] with biopsy on 4/18/24 of right breast nodule with pathology revealing a fibroadenoma Pap smear: 04/24/2023 negative  Bone density DEXA scan: 5/03/23 will obtain from [facility] showed normal bone density                                           09/17/21 showed T scores of 0.3 in the spine, 0.2 in femoral neck and 0.7 in total hip and 0.9 in forearm                                           09/18/19 showed T scores of -0.6 in the spine, -0.4 in femoral neck and 0.1 hip Colonoscopy: 6/12/18 negative and 7/14/23 negative as per patient next follow up in 5 years Genetic testing: Sister had breast cancer at age 26. [facility] genetic panel of 47 genes sent 5/23/19 and negative for any pathogenic variants.

## 2025-06-24 NOTE — ASSESSMENT
High [No evidence of disease] : No evidence of disease [FreeTextEntry1] : moderate , resolving treatment related sequelae. High High High